# Patient Record
Sex: FEMALE | Race: WHITE | NOT HISPANIC OR LATINO | Employment: UNEMPLOYED | URBAN - METROPOLITAN AREA
[De-identification: names, ages, dates, MRNs, and addresses within clinical notes are randomized per-mention and may not be internally consistent; named-entity substitution may affect disease eponyms.]

---

## 2023-08-04 LAB
ALANINE AMINOTRANSFERASE (SGPT) (U/L) IN SER/PLAS: 9 U/L (ref 7–45)
ALBUMIN (G/DL) IN SER/PLAS: 4.1 G/DL (ref 3.4–5)
ALBUMIN (MG/L) IN URINE: 26.4 MG/L
ALBUMIN/CREATININE (UG/MG) IN URINE: 33.9 UG/MG CRT (ref 0–30)
ALKALINE PHOSPHATASE (U/L) IN SER/PLAS: 88 U/L (ref 33–110)
ANION GAP IN SER/PLAS: 12 MMOL/L (ref 10–20)
ASPARTATE AMINOTRANSFERASE (SGOT) (U/L) IN SER/PLAS: 9 U/L (ref 9–39)
BILIRUBIN TOTAL (MG/DL) IN SER/PLAS: 0.3 MG/DL (ref 0–1.2)
CALCIUM (MG/DL) IN SER/PLAS: 8.9 MG/DL (ref 8.6–10.6)
CARBON DIOXIDE, TOTAL (MMOL/L) IN SER/PLAS: 28 MMOL/L (ref 21–32)
CHLORIDE (MMOL/L) IN SER/PLAS: 105 MMOL/L (ref 98–107)
CREATININE (MG/DL) IN SER/PLAS: 0.58 MG/DL (ref 0.5–1.05)
CREATININE (MG/DL) IN URINE: 77.9 MG/DL (ref 20–320)
ERYTHROCYTE DISTRIBUTION WIDTH (RATIO) BY AUTOMATED COUNT: 15.7 % (ref 11.5–14.5)
ERYTHROCYTE MEAN CORPUSCULAR HEMOGLOBIN CONCENTRATION (G/DL) BY AUTOMATED: 30.4 G/DL (ref 32–36)
ERYTHROCYTE MEAN CORPUSCULAR VOLUME (FL) BY AUTOMATED COUNT: 82 FL (ref 80–100)
ERYTHROCYTES (10*6/UL) IN BLOOD BY AUTOMATED COUNT: 4.63 X10E12/L (ref 4–5.2)
ESTIMATED AVERAGE GLUCOSE FOR HBA1C: 154 MG/DL
GFR FEMALE: >90 ML/MIN/1.73M2
GLUCOSE (MG/DL) IN SER/PLAS: 162 MG/DL (ref 74–99)
HEMATOCRIT (%) IN BLOOD BY AUTOMATED COUNT: 37.8 % (ref 36–46)
HEMOGLOBIN (G/DL) IN BLOOD: 11.5 G/DL (ref 12–16)
HEMOGLOBIN A1C/HEMOGLOBIN TOTAL IN BLOOD: 7 %
LEUKOCYTES (10*3/UL) IN BLOOD BY AUTOMATED COUNT: 5.8 X10E9/L (ref 4.4–11.3)
NRBC (PER 100 WBCS) BY AUTOMATED COUNT: 0 /100 WBC (ref 0–0)
PLATELETS (10*3/UL) IN BLOOD AUTOMATED COUNT: 213 X10E9/L (ref 150–450)
POTASSIUM (MMOL/L) IN SER/PLAS: 4.2 MMOL/L (ref 3.5–5.3)
PROTEIN TOTAL: 5.7 G/DL (ref 6.4–8.2)
SODIUM (MMOL/L) IN SER/PLAS: 141 MMOL/L (ref 136–145)
UREA NITROGEN (MG/DL) IN SER/PLAS: 14 MG/DL (ref 6–23)

## 2023-11-02 ENCOUNTER — ANCILLARY PROCEDURE (OUTPATIENT)
Dept: RADIOLOGY | Facility: CLINIC | Age: 48
End: 2023-11-02
Payer: COMMERCIAL

## 2023-11-02 VITALS — HEIGHT: 66 IN | WEIGHT: 176 LBS | BODY MASS INDEX: 28.28 KG/M2

## 2023-11-02 DIAGNOSIS — Z12.31 SCREENING MAMMOGRAM FOR BREAST CANCER: ICD-10-CM

## 2023-11-02 PROCEDURE — 77063 BREAST TOMOSYNTHESIS BI: CPT

## 2023-11-06 PROBLEM — G47.9 SLEEP DISTURBANCE: Status: ACTIVE | Noted: 2023-11-06

## 2023-11-06 PROBLEM — J84.9 ILD (INTERSTITIAL LUNG DISEASE) (MULTI): Status: ACTIVE | Noted: 2023-11-06

## 2023-11-06 PROBLEM — R10.12 ABDOMINAL PAIN, LUQ (LEFT UPPER QUADRANT): Status: ACTIVE | Noted: 2023-11-06

## 2023-11-06 PROBLEM — J31.0 RHINITIS: Status: ACTIVE | Noted: 2023-11-06

## 2023-11-06 PROBLEM — H53.8 BLURRED VISION, BILATERAL: Status: ACTIVE | Noted: 2023-11-06

## 2023-11-06 PROBLEM — G47.11 IDIOPATHIC HYPERSOMNIA: Status: ACTIVE | Noted: 2023-11-06

## 2023-11-06 PROBLEM — R74.8 ABNORMAL LIVER ENZYMES: Status: ACTIVE | Noted: 2023-11-06

## 2023-11-06 PROBLEM — H52.7 REFRACTIVE ERROR: Status: ACTIVE | Noted: 2023-11-06

## 2023-11-06 PROBLEM — G47.21 CIRCADIAN RHYTHM SLEEP DISORDER, DELAYED SLEEP PHASE TYPE: Status: ACTIVE | Noted: 2023-11-06

## 2023-11-06 PROBLEM — E83.51 HYPOCALCEMIA: Status: ACTIVE | Noted: 2023-11-06

## 2023-11-06 PROBLEM — U07.1 COVID-19 VIRUS INFECTION: Status: ACTIVE | Noted: 2023-11-06

## 2023-11-06 PROBLEM — J18.9 PNEUMONIA: Status: ACTIVE | Noted: 2023-11-06

## 2023-11-06 PROBLEM — J32.9 CHRONIC SINUSITIS: Status: ACTIVE | Noted: 2023-11-06

## 2023-11-06 PROBLEM — G47.10 HYPERSOMNIA: Status: ACTIVE | Noted: 2023-11-06

## 2023-11-06 PROBLEM — H04.123 DRY EYES: Status: ACTIVE | Noted: 2023-11-06

## 2023-11-06 PROBLEM — D81.9: Status: ACTIVE | Noted: 2023-11-06

## 2023-11-06 PROBLEM — J45.909 ASTHMA (HHS-HCC): Status: ACTIVE | Noted: 2023-11-06

## 2023-11-06 PROBLEM — K90.41 NON-CELIAC GLUTEN SENSITIVITY: Status: ACTIVE | Noted: 2023-11-06

## 2023-11-06 PROBLEM — R05.9 COUGH: Status: ACTIVE | Noted: 2023-11-06

## 2023-11-06 PROBLEM — E55.9 VITAMIN D DEFICIENCY: Status: ACTIVE | Noted: 2023-11-06

## 2023-11-06 PROBLEM — G47.00 INSOMNIA: Status: ACTIVE | Noted: 2023-11-06

## 2023-11-06 PROBLEM — K76.0 NAFLD (NONALCOHOLIC FATTY LIVER DISEASE): Status: ACTIVE | Noted: 2023-11-06

## 2023-11-06 PROBLEM — G44.89 OTHER HEADACHE SYNDROME: Status: ACTIVE | Noted: 2023-11-06

## 2023-11-06 PROBLEM — J15.9 BACTERIAL PNEUMONIA: Status: ACTIVE | Noted: 2023-11-06

## 2023-11-06 PROBLEM — R92.8 ABNORMAL MAMMOGRAM OF RIGHT BREAST: Status: ACTIVE | Noted: 2023-11-06

## 2023-11-06 PROBLEM — M25.50 ARTHRALGIA: Status: ACTIVE | Noted: 2023-11-06

## 2023-11-06 PROBLEM — E66.9 OBESITY: Status: ACTIVE | Noted: 2023-11-06

## 2023-11-06 PROBLEM — K21.9 ESOPHAGEAL REFLUX: Status: ACTIVE | Noted: 2023-11-06

## 2023-11-06 PROBLEM — E11.8 TYPE 2 DIABETES MELLITUS WITH COMPLICATION, WITHOUT LONG-TERM CURRENT USE OF INSULIN (MULTI): Status: ACTIVE | Noted: 2019-01-14

## 2023-11-06 PROBLEM — R79.9 ABNORMAL BLOOD CHEMISTRY: Status: ACTIVE | Noted: 2023-11-06

## 2023-11-06 PROBLEM — B37.9 YEAST INFECTION: Status: ACTIVE | Noted: 2023-11-06

## 2023-11-06 PROBLEM — J98.4 RESTRICTIVE LUNG DISEASE: Status: ACTIVE | Noted: 2023-11-06

## 2023-11-06 PROBLEM — M15.9 OSTEOARTHRITIS, MULTIPLE SITES: Status: ACTIVE | Noted: 2023-11-06

## 2023-11-06 PROBLEM — J42 CHRONIC BRONCHITIS (MULTI): Status: ACTIVE | Noted: 2023-11-06

## 2023-11-06 PROBLEM — F32.81 PREMENSTRUAL DYSPHORIC DISORDER: Status: ACTIVE | Noted: 2023-11-06

## 2023-11-06 PROBLEM — E87.6 HYPOKALEMIA: Status: ACTIVE | Noted: 2023-11-06

## 2023-11-06 PROBLEM — G25.81 RESTLESS LEGS SYNDROME: Status: ACTIVE | Noted: 2023-11-06

## 2023-11-06 RX ORDER — GLIPIZIDE 5 MG/1
TABLET ORAL
COMMUNITY
End: 2023-11-13 | Stop reason: SDUPTHER

## 2023-11-06 RX ORDER — LORATADINE 10 MG/1
1 TABLET ORAL DAILY PRN
COMMUNITY
Start: 2013-09-06

## 2023-11-06 RX ORDER — ALBUTEROL SULFATE 90 UG/1
AEROSOL, METERED RESPIRATORY (INHALATION)
COMMUNITY

## 2023-11-06 RX ORDER — NAPROXEN SODIUM 220 MG
1 TABLET ORAL 3 TIMES DAILY PRN
COMMUNITY
Start: 2021-10-19

## 2023-11-06 RX ORDER — ZINC SULFATE 50(220)MG
CAPSULE ORAL
COMMUNITY

## 2023-11-06 RX ORDER — GABAPENTIN 600 MG/1
TABLET ORAL
COMMUNITY

## 2023-11-06 RX ORDER — CALCIUM CARBONATE 200(500)MG
TABLET,CHEWABLE ORAL
COMMUNITY

## 2023-11-06 RX ORDER — LANCETS 33 GAUGE
EACH MISCELLANEOUS
COMMUNITY

## 2023-11-06 RX ORDER — REPAGLINIDE 0.5 MG/1
0.5 TABLET ORAL
COMMUNITY
Start: 2023-08-07 | End: 2023-11-07 | Stop reason: ALTCHOICE

## 2023-11-06 RX ORDER — BLOOD-GLUCOSE METER
EACH MISCELLANEOUS
COMMUNITY

## 2023-11-06 RX ORDER — MENTHOL 40 MG/G
CREAM TOPICAL
COMMUNITY

## 2023-11-06 RX ORDER — TIRZEPATIDE 2.5 MG/.5ML
INJECTION, SOLUTION SUBCUTANEOUS
COMMUNITY
End: 2023-11-07 | Stop reason: ALTCHOICE

## 2023-11-06 RX ORDER — DICLOFENAC SODIUM 10 MG/G
GEL TOPICAL
COMMUNITY

## 2023-11-06 RX ORDER — DIPHENHYDRAMINE HCL 25 MG
25 CAPSULE ORAL EVERY 6 HOURS PRN
COMMUNITY
End: 2023-11-07 | Stop reason: ALTCHOICE

## 2023-11-06 RX ORDER — ALBUTEROL SULFATE 0.83 MG/ML
2.5 SOLUTION RESPIRATORY (INHALATION) EVERY 6 HOURS PRN
COMMUNITY
End: 2023-11-07 | Stop reason: ALTCHOICE

## 2023-11-06 RX ORDER — ACETAMINOPHEN 500 MG
TABLET ORAL
COMMUNITY
End: 2023-11-13 | Stop reason: WASHOUT

## 2023-11-06 RX ORDER — KETOTIFEN FUMARATE 0.35 MG/ML
SOLUTION/ DROPS OPHTHALMIC
COMMUNITY

## 2023-11-06 RX ORDER — ZOLPIDEM TARTRATE 5 MG/1
TABLET ORAL
COMMUNITY
End: 2023-11-07 | Stop reason: ALTCHOICE

## 2023-11-06 RX ORDER — SERTRALINE HYDROCHLORIDE 50 MG/1
50 TABLET, FILM COATED ORAL DAILY
COMMUNITY

## 2023-11-06 RX ORDER — CHOLECALCIFEROL (VITAMIN D3) 50 MCG
2000 TABLET ORAL
COMMUNITY

## 2023-11-06 RX ORDER — TIRZEPATIDE 5 MG/.5ML
INJECTION, SOLUTION SUBCUTANEOUS
COMMUNITY
End: 2023-11-07 | Stop reason: ALTCHOICE

## 2023-11-06 RX ORDER — METFORMIN HYDROCHLORIDE 500 MG/1
TABLET ORAL
COMMUNITY
Start: 2019-01-11 | End: 2023-11-07 | Stop reason: ALTCHOICE

## 2023-11-06 RX ORDER — DOXYCYCLINE 100 MG/1
100 CAPSULE ORAL EVERY 12 HOURS
COMMUNITY
Start: 2023-02-13 | End: 2023-11-07 | Stop reason: ALTCHOICE

## 2023-11-06 RX ORDER — CEFUROXIME AXETIL 500 MG/1
TABLET ORAL
COMMUNITY
Start: 2019-01-11 | End: 2023-11-07 | Stop reason: ALTCHOICE

## 2023-11-06 RX ORDER — MENTHOL/CAMPHOR
OINTMENT (GRAM) TOPICAL
COMMUNITY

## 2023-11-06 RX ORDER — MOMETASONE FUROATE 50 UG/1
SPRAY, METERED NASAL
COMMUNITY

## 2023-11-07 ENCOUNTER — OFFICE VISIT (OUTPATIENT)
Dept: OBSTETRICS AND GYNECOLOGY | Facility: CLINIC | Age: 48
End: 2023-11-07
Payer: COMMERCIAL

## 2023-11-07 VITALS
WEIGHT: 187 LBS | BODY MASS INDEX: 30.05 KG/M2 | SYSTOLIC BLOOD PRESSURE: 138 MMHG | HEIGHT: 66 IN | DIASTOLIC BLOOD PRESSURE: 76 MMHG

## 2023-11-07 DIAGNOSIS — Z12.31 SCREENING MAMMOGRAM FOR BREAST CANCER: ICD-10-CM

## 2023-11-07 DIAGNOSIS — F32.81 PREMENSTRUAL DYSPHORIC DISORDER: Primary | ICD-10-CM

## 2023-11-07 PROCEDURE — 3051F HG A1C>EQUAL 7.0%<8.0%: CPT | Performed by: OBSTETRICS & GYNECOLOGY

## 2023-11-07 PROCEDURE — 3078F DIAST BP <80 MM HG: CPT | Performed by: OBSTETRICS & GYNECOLOGY

## 2023-11-07 PROCEDURE — 99396 PREV VISIT EST AGE 40-64: CPT | Performed by: OBSTETRICS & GYNECOLOGY

## 2023-11-07 PROCEDURE — 3075F SYST BP GE 130 - 139MM HG: CPT | Performed by: OBSTETRICS & GYNECOLOGY

## 2023-11-07 RX ORDER — SERTRALINE HYDROCHLORIDE 50 MG/1
50 TABLET, FILM COATED ORAL DAILY
Qty: 90 TABLET | Refills: 3 | Status: CANCELLED | OUTPATIENT
Start: 2023-11-07

## 2023-11-07 RX ORDER — SERTRALINE HYDROCHLORIDE 50 MG/1
50 TABLET, FILM COATED ORAL DAILY
Qty: 30 TABLET | Refills: 11 | Status: SHIPPED | OUTPATIENT
Start: 2023-11-07 | End: 2024-11-01

## 2023-11-07 NOTE — PROGRESS NOTES
Subjective   Patient ID: Harriet Russo is a 48 y.o. female who presents for well woman visit.  Established patient annual exam.  48 years old.  No pregnancies.  No need for contraception.  Cycles are becoming lighter.    Meds for premenstrual dysphoric disorder including Zoloft.  Will refill.  Non-smoker.  On oral meds for diabetes    Works from home for [employer]    Last Pap 2021.  Negative negative.  Next Pap 2026    On exam breast abdominal pelvic exam normal.  Refill Zoloft.  Requisition mammogram.  Follow-up 1 year.  Well woman exam        Review of Systems   All other systems reviewed and are negative.  BI mammo bilateral screening tomosynthesis  Status: Final result     Study Result    Narrative & Impression   Interpreted By:  Dennis Lange,   STUDY:  BI MAMMO BILATERAL SCREENING TOMOSYNTHESIS;  11/2/2023 9:49 am      ACCESSION NUMBER(S):  [id number]      ORDERING CLINICIAN:  DONITA COLES      INDICATION:  Screening.      Based on the Tyrer-Cuzick model for breast cancer risk assessment,  the patient's lifetime risk of breast cancer is 11.3%.      COMPARISON:  2022, 2021, 2017      FINDINGS:  2D and tomosynthesis images were reviewed at 1 mm slice thickness.      Density:  The breast tissue is heterogeneously dense, which may  obscure small masses.      No dominant masses or malignant calcifications are identified.  Scattered benign appearing calcifications are present. There is no  interval change when compared to the previous examination.      IMPRESSION:  No mammographic evidence of malignancy.      BI-RADS CATEGORY:      BI-RADS Category:  2 Benign.  Recommendation:  Routine Screening Mammogram in 1 Year.  Recommended Date:  1 Year.  Laterality:  Bilateral.      For any future breast imaging appointments, please call [phone]). The patient has been entered into a computer reminder system  with a target due date for the next exam.          MACRO:  None      Signed by: Dennis Lange 11/2/2023 2:21  PM  Dictation workstation:   [id number]       Objective   Physical Exam  Chest:   Breasts:     Right: Normal.      Left: Normal.   Genitourinary:     General: Normal vulva.      Vagina: Normal.      Cervix: Normal.      Uterus: Normal.       Adnexa: Right adnexa normal and left adnexa normal.         Assessment/Plan   Well woman exam.  Requisition mammogram.  Refill Zoloft for PMDD.  Next Pap 2026.  Follow-up 1 year

## 2023-11-13 ENCOUNTER — OFFICE VISIT (OUTPATIENT)
Dept: ENDOCRINOLOGY | Facility: CLINIC | Age: 48
End: 2023-11-13
Payer: COMMERCIAL

## 2023-11-13 VITALS
SYSTOLIC BLOOD PRESSURE: 149 MMHG | HEART RATE: 93 BPM | DIASTOLIC BLOOD PRESSURE: 89 MMHG | BODY MASS INDEX: 30.99 KG/M2 | WEIGHT: 192 LBS

## 2023-11-13 DIAGNOSIS — E11.8 TYPE 2 DIABETES MELLITUS WITH COMPLICATION, WITHOUT LONG-TERM CURRENT USE OF INSULIN (MULTI): ICD-10-CM

## 2023-11-13 LAB — POC HEMOGLOBIN A1C: 8.2 % (ref 4.2–6.5)

## 2023-11-13 PROCEDURE — 99214 OFFICE O/P EST MOD 30 MIN: CPT | Performed by: INTERNAL MEDICINE

## 2023-11-13 PROCEDURE — 3051F HG A1C>EQUAL 7.0%<8.0%: CPT | Performed by: INTERNAL MEDICINE

## 2023-11-13 PROCEDURE — 3077F SYST BP >= 140 MM HG: CPT | Performed by: INTERNAL MEDICINE

## 2023-11-13 PROCEDURE — 3079F DIAST BP 80-89 MM HG: CPT | Performed by: INTERNAL MEDICINE

## 2023-11-13 PROCEDURE — 83036 HEMOGLOBIN GLYCOSYLATED A1C: CPT | Performed by: INTERNAL MEDICINE

## 2023-11-13 RX ORDER — GLIPIZIDE 5 MG/1
10 TABLET ORAL
Qty: 360 TABLET | Refills: 3 | Status: SHIPPED | OUTPATIENT
Start: 2023-11-13 | End: 2024-11-12

## 2023-11-13 ASSESSMENT — ENCOUNTER SYMPTOMS
COUGH: 0
ABDOMINAL PAIN: 0
NAUSEA: 0
ENDOCRINE COMMENTS: AS ABOVE
DIARRHEA: 0
VOMITING: 0
UNEXPECTED WEIGHT CHANGE: 0

## 2023-11-13 NOTE — PATIENT INSTRUCTIONS
RECOMMENDATIONS  Increase Glipizide to 10 mg twice daily before breakfast and dinner.   Follow up 3 months  Repeat A1c at next appointment

## 2023-11-13 NOTE — PROGRESS NOTES
History Of Present Illness  Harriet Russo is a 48 y.o. female     Duration of type 2 diabetes mellitus:  14 years  Complications:  none    Hyperglycemia since respiratory infection in August/Septemer    Glucose had been as high as 400    Stopped repaglinide and resumed glipizide 3 weeks ago.   Glipizide 5 mg QAM, 10 mg at dinner.    Prior use of Mounjaro, stopped due to cost.  She is aware cost for Ozempic would be the same.  Intolerant to metformin.  History of polyuria and vaginal candidiasis on Invokana  Prior use of Victoza, without recall of side effect.     Patient is testing glucose 1 time daily  Records reviewed, on file    Last eye exam:  October 2022    Past Medical History  She has a past medical history of Dry eye syndrome of unspecified lacrimal gland (04/01/2013), Other conditions influencing health status (04/01/2013), Unspecified disorder of refraction (04/01/2013), and Unspecified inflammation of eyelid.    Surgical History  She has a past surgical history that includes Mouth surgery (04/14/2016).     Social History  She has no history on file for tobacco use, alcohol use, and drug use.    Family History  Family History   Problem Relation Name Age of Onset    No Known Problems Mother      Atrial fibrillation Father      Mitral valve prolapse Father      Ovarian cancer Maternal Great-Grandmother  65    Glaucoma Other grandfather     Other (compressed vertebrea) Other      Arthritis Other      Other (adverse food reaction) Other family         not anaphylactic    Other (anaphylaxis due to bee venom) Other family     Other (complain of allergic reaction seasonal) Other family     Heart failure Other family     Eczema Other family     Emphysema Other family     Other (hypogammaglobulinemia) Other family     Irritable bowel syndrome Other family     Other (nasal polyps) Other family        Allergies  Hizentra [immun glob g(igg)-pro-iga 0-50]; Immune globulin,gamma (igg) human; Octagam [immun  "globg(igg)-malt-iga ov50]; Pramipexole; Ropinirole; Sulfur; Tramadol; Amoxicillin; and Nabumetone    Review of Systems   Constitutional:  Negative for unexpected weight change.   Respiratory:  Negative for cough.    Gastrointestinal:  Negative for abdominal pain, diarrhea, nausea and vomiting.   Endocrine:        As above         Last Recorded Vitals  Blood pressure 149/89, pulse 93, weight 87.1 kg (192 lb), last menstrual period 11/05/2023.    Physical Exam  Constitutional:       General: She is not in acute distress.  HENT:      Head: Normocephalic.   Eyes:      Extraocular Movements: Extraocular movements intact.   Neck:      Thyroid: No thyromegaly.   Cardiovascular:      Pulses:           Radial pulses are 2+ on the right side and 2+ on the left side.   Musculoskeletal:      Right lower leg: No edema.      Left lower leg: No edema.   Lymphadenopathy:      Cervical: No cervical adenopathy.   Neurological:      Mental Status: She is alert.   Psychiatric:         Mood and Affect: Affect normal.          Relevant Results  Glucose (mg/dL)   Date Value   08/04/2023 162 (H)   05/23/2022 373 (H)   06/10/2021 305 (H)     POC HEMOGLOBIN A1c (%)   Date Value   11/13/2023 8.2 (A)     Hemoglobin A1C (%)   Date Value   08/04/2023 7.0 (A)   05/23/2022 7.8 (A)   06/09/2021 11.6     Bicarbonate (mmol/L)   Date Value   08/04/2023 28   05/23/2022 28   06/10/2021 25     Urea Nitrogen (mg/dL)   Date Value   08/04/2023 14   05/23/2022 11   06/10/2021 7     Creatinine (mg/dL)   Date Value   08/04/2023 0.58   05/23/2022 0.61   06/10/2021 0.50     No components found for: \"[id number]\"  Lab Results   Component Value Date    CHOL 196 05/23/2022     Lab Results   Component Value Date    HDL 31.5 (A) 05/23/2022     No results found for: \"LDLCALC\"  Lab Results   Component Value Date    TRIG 260 (H) 05/23/2022     No components found for: \"CHOLHDL\"   Lab Results   Component Value Date    TSH 2.48 05/23/2022       IMPRESSION  TYPE 2 " DIABETES MELLITUS  Rapid A1c 8.2%  Recent loss of glucose control   Improving with resumed glipizide    RECOMMENDATIONS  Increase Glipizide to 10 mg twice daily before breakfast and dinner.   Follow up 3 months  Repeat A1c at next appointment

## 2024-03-04 ENCOUNTER — APPOINTMENT (OUTPATIENT)
Dept: ENDOCRINOLOGY | Facility: CLINIC | Age: 49
End: 2024-03-04
Payer: COMMERCIAL

## 2024-07-26 ENCOUNTER — TELEPHONE (OUTPATIENT)
Dept: SLEEP MEDICINE | Facility: HOSPITAL | Age: 49
End: 2024-07-26

## 2024-07-26 ENCOUNTER — APPOINTMENT (OUTPATIENT)
Dept: SLEEP MEDICINE | Facility: CLINIC | Age: 49
End: 2024-07-26
Payer: COMMERCIAL

## 2024-07-26 DIAGNOSIS — G25.81 RESTLESS LEG SYNDROME: ICD-10-CM

## 2024-07-26 NOTE — TELEPHONE ENCOUNTER
Pt called because she needs to reschedule yearly appt with sleep provider. Pt requested gabapentin 600mg #120 for 90 days medication refill.    This RN checked [facility] and it is consist with prescribed medications. Patient has been filling Rx appropriately. Prescription request sent to provider Dr. Obrien to review.

## 2024-07-28 RX ORDER — GABAPENTIN 600 MG/1
TABLET ORAL
Qty: 120 TABLET | Refills: 2 | Status: SHIPPED | OUTPATIENT
Start: 2024-07-28

## 2024-10-28 ENCOUNTER — OFFICE VISIT (OUTPATIENT)
Dept: RHEUMATOLOGY | Facility: CLINIC | Age: 49
End: 2024-10-28
Payer: COMMERCIAL

## 2024-10-28 VITALS
WEIGHT: 176.6 LBS | DIASTOLIC BLOOD PRESSURE: 89 MMHG | OXYGEN SATURATION: 100 % | SYSTOLIC BLOOD PRESSURE: 163 MMHG | BODY MASS INDEX: 28.5 KG/M2 | HEART RATE: 113 BPM

## 2024-10-28 DIAGNOSIS — M25.852 FEMOROACETABULAR IMPINGEMENT OF BOTH HIPS: ICD-10-CM

## 2024-10-28 DIAGNOSIS — M25.851 FEMOROACETABULAR IMPINGEMENT OF BOTH HIPS: ICD-10-CM

## 2024-10-28 DIAGNOSIS — M25.50 POLYARTHRALGIA: Primary | ICD-10-CM

## 2024-10-28 DIAGNOSIS — D83.9 CVID (COMMON VARIABLE IMMUNODEFICIENCY): ICD-10-CM

## 2024-10-28 PROCEDURE — 3079F DIAST BP 80-89 MM HG: CPT | Performed by: INTERNAL MEDICINE

## 2024-10-28 PROCEDURE — 99215 OFFICE O/P EST HI 40 MIN: CPT | Performed by: INTERNAL MEDICINE

## 2024-10-28 PROCEDURE — 3077F SYST BP >= 140 MM HG: CPT | Performed by: INTERNAL MEDICINE

## 2024-10-28 PROCEDURE — 99205 OFFICE O/P NEW HI 60 MIN: CPT | Performed by: INTERNAL MEDICINE

## 2024-10-28 ASSESSMENT — ENCOUNTER SYMPTOMS
DEPRESSION: 0
LOSS OF SENSATION IN FEET: 1
OCCASIONAL FEELINGS OF UNSTEADINESS: 0

## 2024-10-28 ASSESSMENT — PATIENT HEALTH QUESTIONNAIRE - PHQ9
1. LITTLE INTEREST OR PLEASURE IN DOING THINGS: NOT AT ALL
SUM OF ALL RESPONSES TO PHQ9 QUESTIONS 1 AND 2: 0
2. FEELING DOWN, DEPRESSED OR HOPELESS: NOT AT ALL

## 2024-10-28 ASSESSMENT — PAIN SCALES - GENERAL: PAINLEVEL_OUTOF10: 2

## 2024-10-31 ENCOUNTER — HOSPITAL ENCOUNTER (OUTPATIENT)
Dept: RADIOLOGY | Facility: CLINIC | Age: 49
Discharge: HOME | End: 2024-10-31
Payer: COMMERCIAL

## 2024-10-31 ENCOUNTER — LAB (OUTPATIENT)
Dept: LAB | Facility: LAB | Age: 49
End: 2024-10-31
Payer: COMMERCIAL

## 2024-10-31 DIAGNOSIS — D50.9 MICROCYTIC ANEMIA: ICD-10-CM

## 2024-10-31 DIAGNOSIS — M25.50 POLYARTHRALGIA: ICD-10-CM

## 2024-10-31 LAB
ALBUMIN SERPL BCP-MCNC: 4.4 G/DL (ref 3.4–5)
ALP SERPL-CCNC: 119 U/L (ref 33–110)
ALT SERPL W P-5'-P-CCNC: 8 U/L (ref 7–45)
ANION GAP SERPL CALC-SCNC: 14 MMOL/L (ref 10–20)
AST SERPL W P-5'-P-CCNC: 8 U/L (ref 9–39)
BASOPHILS # BLD AUTO: 0.04 X10*3/UL (ref 0–0.1)
BASOPHILS NFR BLD AUTO: 0.5 %
BILIRUB SERPL-MCNC: 0.3 MG/DL (ref 0–1.2)
BUN SERPL-MCNC: 22 MG/DL (ref 6–23)
CALCIUM SERPL-MCNC: 9.9 MG/DL (ref 8.6–10.6)
CCP IGG SERPL-ACNC: <1 U/ML
CENTROMERE B AB SER-ACNC: <0.2 AI
CHLORIDE SERPL-SCNC: 102 MMOL/L (ref 98–107)
CHROMATIN AB SERPL-ACNC: <0.2 AI
CO2 SERPL-SCNC: 30 MMOL/L (ref 21–32)
CREAT SERPL-MCNC: 0.54 MG/DL (ref 0.5–1.05)
CRP SERPL-MCNC: 3.09 MG/DL
DSDNA AB SER-ACNC: <1 IU/ML
EGFRCR SERPLBLD CKD-EPI 2021: >90 ML/MIN/1.73M*2
ENA JO1 AB SER QL IA: <0.2 AI
ENA RNP AB SER IA-ACNC: <0.2 AI
ENA SCL70 AB SER QL IA: <0.2 AI
ENA SM AB SER IA-ACNC: <0.2 AI
ENA SM+RNP AB SER QL IA: <0.2 AI
ENA SS-A AB SER IA-ACNC: <0.2 AI
ENA SS-B AB SER IA-ACNC: <0.2 AI
EOSINOPHIL # BLD AUTO: 0.25 X10*3/UL (ref 0–0.7)
EOSINOPHIL NFR BLD AUTO: 3.2 %
ERYTHROCYTE [DISTWIDTH] IN BLOOD BY AUTOMATED COUNT: 15.2 % (ref 11.5–14.5)
ERYTHROCYTE [SEDIMENTATION RATE] IN BLOOD BY WESTERGREN METHOD: 37 MM/H (ref 0–20)
GLUCOSE SERPL-MCNC: 186 MG/DL (ref 74–99)
HCT VFR BLD AUTO: 36.1 % (ref 36–46)
HGB BLD-MCNC: 11.4 G/DL (ref 12–16)
IMM GRANULOCYTES # BLD AUTO: 0.06 X10*3/UL (ref 0–0.7)
IMM GRANULOCYTES NFR BLD AUTO: 0.8 % (ref 0–0.9)
LYMPHOCYTES # BLD AUTO: 1.76 X10*3/UL (ref 1.2–4.8)
LYMPHOCYTES NFR BLD AUTO: 22.7 %
MCH RBC QN AUTO: 24.4 PG (ref 26–34)
MCHC RBC AUTO-ENTMCNC: 31.6 G/DL (ref 32–36)
MCV RBC AUTO: 77 FL (ref 80–100)
MONOCYTES # BLD AUTO: 0.58 X10*3/UL (ref 0.1–1)
MONOCYTES NFR BLD AUTO: 7.5 %
NEUTROPHILS # BLD AUTO: 5.06 X10*3/UL (ref 1.2–7.7)
NEUTROPHILS NFR BLD AUTO: 65.3 %
NRBC BLD-RTO: 0 /100 WBCS (ref 0–0)
PLATELET # BLD AUTO: 320 X10*3/UL (ref 150–450)
POTASSIUM SERPL-SCNC: 4.6 MMOL/L (ref 3.5–5.3)
PROT SERPL-MCNC: 6.1 G/DL (ref 6.4–8.2)
RBC # BLD AUTO: 4.67 X10*6/UL (ref 4–5.2)
RHEUMATOID FACT SER NEPH-ACNC: <10 IU/ML (ref 0–15)
RIBOSOMAL P AB SER-ACNC: <0.2 AI
SODIUM SERPL-SCNC: 141 MMOL/L (ref 136–145)
WBC # BLD AUTO: 7.8 X10*3/UL (ref 4.4–11.3)

## 2024-10-31 PROCEDURE — 73560 X-RAY EXAM OF KNEE 1 OR 2: CPT | Mod: 50

## 2024-10-31 PROCEDURE — 86140 C-REACTIVE PROTEIN: CPT

## 2024-10-31 PROCEDURE — 86235 NUCLEAR ANTIGEN ANTIBODY: CPT

## 2024-10-31 PROCEDURE — 73120 X-RAY EXAM OF HAND: CPT | Mod: 50

## 2024-10-31 PROCEDURE — 83540 ASSAY OF IRON: CPT

## 2024-10-31 PROCEDURE — 86200 CCP ANTIBODY: CPT

## 2024-10-31 PROCEDURE — 85652 RBC SED RATE AUTOMATED: CPT

## 2024-10-31 PROCEDURE — 86431 RHEUMATOID FACTOR QUANT: CPT

## 2024-10-31 PROCEDURE — 86038 ANTINUCLEAR ANTIBODIES: CPT

## 2024-10-31 PROCEDURE — 82728 ASSAY OF FERRITIN: CPT

## 2024-10-31 PROCEDURE — 85025 COMPLETE CBC W/AUTO DIFF WBC: CPT

## 2024-10-31 PROCEDURE — 36415 COLL VENOUS BLD VENIPUNCTURE: CPT

## 2024-10-31 PROCEDURE — 80053 COMPREHEN METABOLIC PANEL: CPT

## 2024-10-31 PROCEDURE — 83550 IRON BINDING TEST: CPT

## 2024-10-31 PROCEDURE — 86225 DNA ANTIBODY NATIVE: CPT

## 2024-10-31 PROCEDURE — 73030 X-RAY EXAM OF SHOULDER: CPT | Mod: RT

## 2024-11-01 DIAGNOSIS — D50.9 MICROCYTIC ANEMIA: Primary | ICD-10-CM

## 2024-11-01 LAB
FERRITIN SERPL-MCNC: 105 NG/ML (ref 8–150)
IRON SATN MFR SERPL: 8 % (ref 25–45)
IRON SERPL-MCNC: 30 UG/DL (ref 35–150)
TIBC SERPL-MCNC: 363 UG/DL (ref 240–445)
UIBC SERPL-MCNC: 333 UG/DL (ref 110–370)

## 2024-11-04 LAB — ANA SER QL HEP2 SUBST: NEGATIVE

## 2024-11-05 DIAGNOSIS — M25.50 POLYARTHRALGIA: Primary | ICD-10-CM

## 2024-11-06 ENCOUNTER — HOSPITAL ENCOUNTER (OUTPATIENT)
Dept: RADIOLOGY | Facility: CLINIC | Age: 49
Discharge: HOME | End: 2024-11-06
Payer: COMMERCIAL

## 2024-11-06 ENCOUNTER — TELEPHONE (OUTPATIENT)
Facility: CLINIC | Age: 49
End: 2024-11-06
Payer: COMMERCIAL

## 2024-11-06 VITALS — HEIGHT: 66 IN | WEIGHT: 176 LBS | BODY MASS INDEX: 28.28 KG/M2

## 2024-11-06 DIAGNOSIS — Z12.31 SCREENING MAMMOGRAM FOR BREAST CANCER: ICD-10-CM

## 2024-11-06 PROCEDURE — 77067 SCR MAMMO BI INCL CAD: CPT

## 2024-11-06 PROCEDURE — 77067 SCR MAMMO BI INCL CAD: CPT | Performed by: STUDENT IN AN ORGANIZED HEALTH CARE EDUCATION/TRAINING PROGRAM

## 2024-11-06 PROCEDURE — 77063 BREAST TOMOSYNTHESIS BI: CPT | Performed by: STUDENT IN AN ORGANIZED HEALTH CARE EDUCATION/TRAINING PROGRAM

## 2024-11-06 NOTE — TELEPHONE ENCOUNTER
I called the patient to let her know the doctor ordered an ultrasound of her hands and to also remind her to schedule an appointment with Endocrinology. I spoke with the patient.

## 2024-11-12 ENCOUNTER — APPOINTMENT (OUTPATIENT)
Dept: OBSTETRICS AND GYNECOLOGY | Facility: CLINIC | Age: 49
End: 2024-11-12
Payer: COMMERCIAL

## 2024-11-12 VITALS
BODY MASS INDEX: 27.8 KG/M2 | WEIGHT: 173 LBS | DIASTOLIC BLOOD PRESSURE: 76 MMHG | HEIGHT: 66 IN | SYSTOLIC BLOOD PRESSURE: 126 MMHG

## 2024-11-12 DIAGNOSIS — Z12.31 SCREENING MAMMOGRAM FOR BREAST CANCER: ICD-10-CM

## 2024-11-12 DIAGNOSIS — Z01.419 WELL WOMAN EXAM: ICD-10-CM

## 2024-11-12 DIAGNOSIS — N94.3 PMS (PREMENSTRUAL SYNDROME): Primary | ICD-10-CM

## 2024-11-12 PROCEDURE — 3074F SYST BP LT 130 MM HG: CPT | Performed by: OBSTETRICS & GYNECOLOGY

## 2024-11-12 PROCEDURE — 3008F BODY MASS INDEX DOCD: CPT | Performed by: OBSTETRICS & GYNECOLOGY

## 2024-11-12 PROCEDURE — 3078F DIAST BP <80 MM HG: CPT | Performed by: OBSTETRICS & GYNECOLOGY

## 2024-11-12 PROCEDURE — 99396 PREV VISIT EST AGE 40-64: CPT | Performed by: OBSTETRICS & GYNECOLOGY

## 2024-11-12 RX ORDER — IBUPROFEN 200 MG
200 TABLET ORAL EVERY 6 HOURS
COMMUNITY

## 2024-11-12 RX ORDER — SERTRALINE HYDROCHLORIDE 50 MG/1
50 TABLET, FILM COATED ORAL DAILY
Qty: 30 TABLET | Refills: 11 | Status: SHIPPED | OUTPATIENT
Start: 2024-11-12 | End: 2025-11-12

## 2024-11-12 NOTE — PROGRESS NOTES
Subjective   Patient ID: Harriet Russo is a 49 y.o. female who presents for Well woman visit.  Review of my note from annual in 2023,  Expand All Collapse All       Subjective  Patient ID: Harriet Russo is a 48 y.o. female who presents for well woman visit.  Established patient annual exam.  48 years old.  No pregnancies.  No need for contraception.  Cycles are becoming lighter.     Meds for premenstrual dysphoric disorder including Zoloft.  Will refill.  Non-smoker.  On oral meds for diabetes     Works from home for [employer]     Last Pap 2021.  Negative negative.  Next Pap 2026     On exam breast abdominal pelvic exam normal.  Refill Zoloft.  Requisition mammogram.  Follow-up 1 year.  Well woman exam      Recent mammogram,  BI mammo bilateral screening tomosynthesis  Status: Final result    Study Result    Narrative & Impression  Interpreted By:  Elia Rose,   STUDY:  BI MAMMO BILATERAL SCREENING TOMOSYNTHESIS;  11/6/2024 2:10 pm      ACCESSION NUMBER(S):  [id number]      ORDERING CLINICIAN:  DONITA COLES      INDICATION:  Screening.      ,Z12.31 Encounter for screening mammogram for malignant neoplasm of  breast      COMPARISON:  01/02/2023, 10/20/2022, 08/11/2021      FINDINGS:  2D and tomosynthesis images were reviewed at 1 mm slice thickness.      Density:  There are scattered areas of fibroglandular density.      Stable focal asymmetry with associated benign calcifications in the  superior right breast at middle depth. No suspicious masses or  calcifications are identified.      IMPRESSION:  No mammographic evidence of malignancy.      BI-RADS CATEGORY:  BI-RADS Category:  2 Benign.  Recommendation:  Annual Screening.  Recommended Date:  1 Year.  Laterality:  Bilateral.              For any future breast imaging appointments, please call [phone]).          MACRO:  None      Signed by: Elia Rose 11/7/2024 4:22 PM  Dictation workstation:   [id number]    49 years old annual exam.  She is now working  for a [employer].  She has a number of pets at home including 6 cats and 2 dogs.  Nupathe cycles are monthly they do vary from 3 days of heavy bleeding to 10 days of light.  Last Pap 2021.  Next Pap 2026.  We will refill her Zoloft for premenstrual dysphoric disorder non-smoker    On exam breast abdominal pelvic exams normal.  Well woman exam.  Follow-up 1 year        Review of Systems   All other systems reviewed and are negative.      Objective   Physical Exam  Constitutional:       Appearance: Normal appearance. She is normal weight.   Chest:   Breasts:     Right: Normal.      Left: Normal.   Genitourinary:     General: Normal vulva.      Cervix: Normal.      Uterus: Normal.       Adnexa: Right adnexa normal and left adnexa normal.   Neurological:      Mental Status: She is alert.         Assessment/Plan   Well woman exam.  Mammogram reviewed.  Refill of Zoloft 50 for PMDD .  States no need for contraception         Bill Hicks MD 11/12/24 11:49 AM

## 2024-11-14 ENCOUNTER — OFFICE VISIT (OUTPATIENT)
Dept: SLEEP MEDICINE | Facility: HOSPITAL | Age: 49
End: 2024-11-14
Payer: COMMERCIAL

## 2024-11-14 DIAGNOSIS — G47.00 INSOMNIA, UNSPECIFIED TYPE: ICD-10-CM

## 2024-11-14 DIAGNOSIS — G25.81 RESTLESS LEGS SYNDROME: Primary | ICD-10-CM

## 2024-11-14 DIAGNOSIS — G47.21 CIRCADIAN RHYTHM SLEEP DISORDER, DELAYED SLEEP PHASE TYPE: ICD-10-CM

## 2024-11-14 DIAGNOSIS — G47.26 SHIFT WORK SLEEP DISORDER: ICD-10-CM

## 2024-11-14 DIAGNOSIS — G25.81 RESTLESS LEG SYNDROME: ICD-10-CM

## 2024-11-14 PROCEDURE — 99214 OFFICE O/P EST MOD 30 MIN: CPT | Mod: 95 | Performed by: STUDENT IN AN ORGANIZED HEALTH CARE EDUCATION/TRAINING PROGRAM

## 2024-11-14 PROCEDURE — 99214 OFFICE O/P EST MOD 30 MIN: CPT | Performed by: STUDENT IN AN ORGANIZED HEALTH CARE EDUCATION/TRAINING PROGRAM

## 2024-11-14 RX ORDER — GABAPENTIN 600 MG/1
TABLET ORAL
Qty: 120 TABLET | Refills: 3 | Status: SHIPPED | OUTPATIENT
Start: 2024-11-14

## 2024-11-14 ASSESSMENT — ENCOUNTER SYMPTOMS
CONSTITUTIONAL NEGATIVE: 1
PSYCHIATRIC NEGATIVE: 1
CARDIOVASCULAR NEGATIVE: 1
RESPIRATORY NEGATIVE: 1
NEUROLOGICAL NEGATIVE: 1

## 2024-11-15 ENCOUNTER — APPOINTMENT (OUTPATIENT)
Dept: RADIOLOGY | Facility: HOSPITAL | Age: 49
End: 2024-11-15
Payer: COMMERCIAL

## 2024-11-15 NOTE — ASSESSMENT & PLAN NOTE
Working second shift and doesn't get home till about 12:30-1 am.  And schedule went on to be later and later, consequently would sleep until 1-2 pm hence, recently not falling asleep until about 5 am.    Advised to slowly pull back her clock by adjusting to wake up 15-30 min earlier each week while still practicing good sleep hygiene and let her body tell her when she is ready to sleep

## 2024-11-15 NOTE — ASSESSMENT & PLAN NOTE
- symptoms well controlled on gabapentin 600 mg  - patient increase to 1.5-2 tabs (900 - 1200 mg) during her pre-period time with improvement  - stable, renew script

## 2024-11-18 ENCOUNTER — APPOINTMENT (OUTPATIENT)
Dept: RADIOLOGY | Facility: HOSPITAL | Age: 49
End: 2024-11-18
Payer: COMMERCIAL

## 2024-11-19 ENCOUNTER — HOSPITAL ENCOUNTER (OUTPATIENT)
Dept: RADIOLOGY | Facility: HOSPITAL | Age: 49
Discharge: HOME | End: 2024-11-19
Payer: COMMERCIAL

## 2024-11-19 DIAGNOSIS — M25.50 POLYARTHRALGIA: ICD-10-CM

## 2024-11-19 PROCEDURE — 76882 US LMTD JT/FCL EVL NVASC XTR: CPT | Performed by: RADIOLOGY

## 2024-11-19 PROCEDURE — 76881 US COMPL JOINT R-T W/IMG: CPT

## 2024-11-23 DIAGNOSIS — M65.941 TENOSYNOVITIS OF BOTH HANDS: Primary | ICD-10-CM

## 2024-11-23 DIAGNOSIS — M65.942 TENOSYNOVITIS OF BOTH HANDS: Primary | ICD-10-CM

## 2024-11-24 ENCOUNTER — PATIENT MESSAGE (OUTPATIENT)
Facility: CLINIC | Age: 49
End: 2024-11-24
Payer: COMMERCIAL

## 2024-11-29 ENCOUNTER — APPOINTMENT (OUTPATIENT)
Facility: CLINIC | Age: 49
End: 2024-11-29
Payer: COMMERCIAL

## 2024-12-02 ENCOUNTER — APPOINTMENT (OUTPATIENT)
Dept: SLEEP MEDICINE | Facility: CLINIC | Age: 49
End: 2024-12-02
Payer: COMMERCIAL

## 2024-12-04 ENCOUNTER — TELEPHONE (OUTPATIENT)
Dept: RHEUMATOLOGY | Facility: CLINIC | Age: 49
End: 2024-12-04

## 2024-12-04 ENCOUNTER — APPOINTMENT (OUTPATIENT)
Dept: RHEUMATOLOGY | Facility: CLINIC | Age: 49
End: 2024-12-04
Payer: COMMERCIAL

## 2024-12-08 ENCOUNTER — APPOINTMENT (OUTPATIENT)
Dept: RADIOLOGY | Facility: HOSPITAL | Age: 49
End: 2024-12-08
Payer: COMMERCIAL

## 2024-12-10 ENCOUNTER — TELEPHONE (OUTPATIENT)
Dept: RHEUMATOLOGY | Facility: CLINIC | Age: 49
End: 2024-12-10
Payer: COMMERCIAL

## 2024-12-10 NOTE — TELEPHONE ENCOUNTER
I called to see if the patient had a number on a denial letter the doctor could call to appeal it.

## 2024-12-18 ENCOUNTER — APPOINTMENT (OUTPATIENT)
Dept: RADIOLOGY | Facility: CLINIC | Age: 49
End: 2024-12-18
Payer: COMMERCIAL

## 2024-12-18 ENCOUNTER — HOSPITAL ENCOUNTER (OUTPATIENT)
Dept: RADIOLOGY | Facility: CLINIC | Age: 49
Discharge: HOME | End: 2024-12-18
Payer: COMMERCIAL

## 2024-12-18 DIAGNOSIS — M65.942 TENOSYNOVITIS OF BOTH HANDS: ICD-10-CM

## 2024-12-18 DIAGNOSIS — M65.941 TENOSYNOVITIS OF BOTH HANDS: ICD-10-CM

## 2024-12-18 PROCEDURE — 2550000001 HC RX 255 CONTRASTS: Performed by: INTERNAL MEDICINE

## 2024-12-18 PROCEDURE — A9575 INJ GADOTERATE MEGLUMI 0.1ML: HCPCS | Performed by: INTERNAL MEDICINE

## 2024-12-18 PROCEDURE — 73220 MRI UPPR EXTREMITY W/O&W/DYE: CPT | Mod: RT

## 2024-12-18 RX ORDER — GADOTERATE MEGLUMINE 376.9 MG/ML
0.2 INJECTION INTRAVENOUS
Status: COMPLETED | OUTPATIENT
Start: 2024-12-18 | End: 2024-12-18

## 2024-12-19 ENCOUNTER — PATIENT MESSAGE (OUTPATIENT)
Dept: RHEUMATOLOGY | Facility: CLINIC | Age: 49
End: 2024-12-19
Payer: COMMERCIAL

## 2024-12-20 DIAGNOSIS — M19.90 INFLAMMATORY ARTHRITIS: Primary | ICD-10-CM

## 2024-12-20 DIAGNOSIS — T14.8XXA TENDON TEAR: ICD-10-CM

## 2024-12-20 RX ORDER — MELOXICAM 15 MG/1
15 TABLET ORAL DAILY
Qty: 30 TABLET | Refills: 0 | Status: SHIPPED | OUTPATIENT
Start: 2024-12-20 | End: 2025-01-19

## 2025-01-05 NOTE — PROGRESS NOTES
Subjective   Patient ID: [id number]  Harriet Russo is a 49 y.o. female who presents for follow up     HPI  PMH/PSH: DM2, RLS, OA, CVID, SONJA, Reactive airway disease  Social: Nonsmoker, rarely drinks alcohol, no drug use. Works in [employer]   FHx: Sister with connective tissue disease, maternal aunt and uncle with RA, paternal grandmother with RA. Mom and brother have less severe form of immune deficiency.     She's seen rheumatology on/off since 16 and when she moved back from [place] she started seeing Dr. Diop.      She has chronic polyarthralgias.   Since memorial day weekend, she did a lot of yard work and her joints (mostly PIPs) have been hurting since and her wrists are swollen. Pain is worse with activity. Using scissors and wrenches to open jars/packages because she has been in so much pain.   She is doing OTC aleve three times a day it was helping up until May, voltaren gel on hands and icy hot on knees and feet and it takes the edge off.   She has had this pain chronically but her symptoms have worsened since memorial day.      R shoulder, L knee, L hip started bothering her pretty bad since 2022. Pain is worse with activity and improves with rest.      AM stiffness in hands 3-4 hours.     She was started on meloxicam 15mg daily 3 weeks ago and notes improved ROM and is using tylenol 1000mg once daily and menthol creams, reports ~30% improvement. Has an appointment with Dr. Rivas in two weeks.      Patient is worried about possible infusion reactions with TNFi and is also concerned about infections.     No fever, chills, weight loss, night sweats or headaches. + dry eyes and sees ophtha. No blurry vision, redness or pain or photophobia. + dry mouth, dental loss. No loss of taste, nasal or oral ulcers, difficulty swallowing. No chest pain, palpitations, orthopnea. No shortness of breath, cough. No dysphagia, nausea, vomiting, + heartburn. No abdominal pain, constipation, + diarrhea. No melena or  hematochezia  No genital or anal ulcers. No photosensitivity, rash or lesions, + Raynaud's phenomenon a few years ago but hasn't recurred.  + chronic numbness or tingling in hands and feet  Denies history of clots (arterial or venous). Never been pregnant.     Patient Active Problem List   Diagnosis    Abdominal pain, LUQ (left upper quadrant)    Abnormal blood chemistry    Abnormal mammogram of right breast    Arthralgia    Asthma    Bacterial pneumonia    Blurred vision, bilateral    Chronic sinusitis    Circadian rhythm sleep disorder, delayed sleep phase type    Combined variable immunodeficiency (Multi)    Cough    COVID-19 virus infection    Type 2 diabetes mellitus with complication, without long-term current use of insulin (Multi)    Dry eyes    Esophageal reflux    Hypocalcemia    Hypokalemia    Idiopathic hypersomnia    Insomnia    NAFLD (nonalcoholic fatty liver disease)    Non-celiac gluten sensitivity    Obesity    Osteoarthritis, multiple sites    Other headache syndrome    Pneumonia    Premenstrual dysphoric disorder    Refractive error    Restless legs syndrome    ILD (interstitial lung disease) (Multi)    Rhinitis    Sleep disturbance    Vitamin D deficiency    Yeast infection    Abnormal liver enzymes    Chronic bronchitis (Multi)    Restrictive lung disease    Hypersomnia    Shift work sleep disorder       Past Medical History:   Diagnosis Date    Dry eye syndrome of unspecified lacrimal gland 04/01/2013    Dry eye syndrome    Other conditions influencing health status 04/01/2013    Diabetes Mellitus Poorly Controlled    Unspecified disorder of refraction 04/01/2013    Refractive disorder    Unspecified inflammation of eyelid     Eyelid inflammation       Past Surgical History:   Procedure Laterality Date    MOUTH SURGERY  04/14/2016    Oral Surgery Tooth Extraction       Social History     Socioeconomic History    Marital status: Single     Spouse name: Not on file    Number of children: Not on  file    Years of education: Not on file    Highest education level: Not on file   Occupational History    Not on file   Tobacco Use    Smoking status: Unknown    Smokeless tobacco: Not on file   Substance and Sexual Activity    Alcohol use: Not Currently    Drug use: Not on file    Sexual activity: Not on file   Other Topics Concern    Not on file   Social History Narrative    Not on file     Social Drivers of Health     Financial Resource Strain: Not on file   Food Insecurity: Not on file   Transportation Needs: Not on file   Physical Activity: Not on file   Stress: Not on file   Social Connections: Not on file   Intimate Partner Violence: Not on file   Housing Stability: Not on file       Allergies   Allergen Reactions    Hizentra [Immun Glob G(Igg)-Pro-Iga 0-50] Unknown    Immune Globulin,Gamma (Igg) Human Unknown    Octagam [Immun Globg(Igg)-Malt-Iga Ov50] Unknown    Pramipexole Unknown    Ropinirole Unknown    Sulfur Unknown    Tramadol Headache    Amoxicillin Rash    Nabumetone Rash         Current Outpatient Medications:     albuterol (Ventolin HFA) 90 mcg/actuation inhaler, Ventolin  (90 Base) MCG/ACT Inhalation Aerosol Solution  Refills: 0     Active, Disp: , Rfl:     calcium carbonate (Tums) 200 mg calcium chewable tablet, Tums CHEW  Refills: 0     Active, Disp: , Rfl:     camphor-menthoL (Tiger Balm) ointment, [place] OINT  Refills: 0     Active, Disp: , Rfl:     cholecalciferol (Vitamin D-3) 50 MCG (2000 UT) tablet, 1 tablet (2,000 Units)., Disp: , Rfl:     colostrum, bovine 500 mg capsule, Once daily, Disp: , Rfl:     diclofenac sodium (Voltaren Arthritis Pain) 1 % gel gel, Voltaren GEL  Refills: 0     Active, Disp: , Rfl:     gabapentin (Neurontin) 600 mg tablet, take 1 tablet by mouth at bedtime and increase to 2 tablets nightly the week before period (#120=90 day supply per prescriber), Disp: 120 tablet, Rfl: 3    glipiZIDE (Glucotrol) 5 mg tablet, Take 2 tablets (10 mg) by mouth 2 times a  day before meals., Disp: 360 tablet, Rfl: 3    ibuprofen 200 mg tablet, Take 1 tablet (200 mg) by mouth every 6 hours., Disp: , Rfl:     ketotifen (Zaditor) 0.025 % (0.035 %) ophthalmic solution, Zaditor 0.025 % Ophthalmic Solution  Refills: 0     Active, Disp: , Rfl:     L. acidophilus/L.bulgaricus (LACTOBACILLUS ACIDOPH-L.BULGAR ORAL), Take 2 tablets by mouth 2 times a day with meals., Disp: , Rfl:     lancets 33 gauge misc, , Disp: , Rfl:     loperamide HCl (IMODIUM ORAL), Imodium CAPS  Refills: 0     Active, Disp: , Rfl:     loratadine (Claritin) 10 mg tablet, Take 1 tablet (10 mg) by mouth once daily as needed., Disp: , Rfl:     meloxicam (Mobic) 15 mg tablet, Take 1 tablet (15 mg) by mouth once daily., Disp: 30 tablet, Rfl: 0    menthol 4 % cream, Menthol (Topical Analgesic) CREA  Refills: 0     Active, Disp: , Rfl:     mometasone (Nasonex) 50 mcg/actuation nasal spray, USE AS DIRECTED AS NEEDED - SPRING ALLERGIES, Disp: , Rfl:     mv,calcium,min/iron/folic/vitK (MULTI FOR HER ORAL), TAKE AS DIRECTED., Disp: , Rfl:     OneTouch Verio test strips strip, TEST THREE TIMES DAILY, Disp: , Rfl:     propylene glycol-glycerin (Soothe) 0.6-0.6 % dropperette ophthalmic solution, Soothe 0.6-0.6 % Ophthalmic Solution  Refills: 0     Active, Disp: , Rfl:     sertraline (Zoloft) 50 mg tablet, Take 1 tablet (50 mg) by mouth once daily., Disp: 30 tablet, Rfl: 11    sertraline (Zoloft) 50 mg tablet, Take 1 tablet (50 mg) by mouth once daily., Disp: 30 tablet, Rfl: 11    sodium chloride (Ayr) 0.65 % nasal drops, 2 SPRAYS EACH NOSTRIL AS NEEDED (WINTER), Disp: , Rfl:     zinc sulfate (Zincate) 220 (50 Zn) MG capsule, Zinc CAPS  Refills: 0     Active, Disp: , Rfl:     Objective     Visit Vitals  /74   Pulse 100     Physical Exam  Copied from previous exam  General: AAOx3, Cooperative  Head: normocephalic, atraumatic  Eyes: EOMI, conjunctiva clear, sclera white, anicteric  Throat/Mouth: No oral deformities, no cheek  swelling, mucosa appear dry  Neck/Lymph: FROM, trachea midline  Neuro: CN II-XII grossly intact, no focal deficit  Skin: No rashes, ulcers or photosensitive areas  MSK: Upper Extremities:  Hand/Fingers: ttp of R 2nd, 3rd and 4th PIP and L 2nd, 3rd, 4th and 5th PIP. No erythema, swelling, tenderness or warmth at DIP, PIP, or MCP joints, FROM grossly. Good hand . Subluxation of R 5th digit present   Wrists: TTP, warmth or tenderness, swelling at wrist b/l L>R, guarded ROM grossly.   Elbows: No tenderness, swelling, erythema or warmth at elbows, FROM grossly. No nodules   Shoulders: FROM  Lower Extremities:   Hips: No obvious deformities. L hip with positive Declan.   Knees: No tenderness, deformities, swelling, rashes, or warmth, normal ROM grossly. + crepitus more pronounced on the R.   Ankles: No deformities, tenderness, edema, erythema, ulceration, or warmth at the ankle  Feet: Negative MTP squeeze. Normal ROM grossly.   Spine: No spinal tenderness to palpation. No SI joint tenderness. Gaenslen test negative      Lab Results   Component Value Date    WBC 7.8 10/31/2024    HGB 11.4 (L) 10/31/2024    HCT 36.1 10/31/2024    MCV 77 (L) 10/31/2024     10/31/2024       Chemistry    Lab Results   Component Value Date/Time     10/31/2024 0851    K 4.6 10/31/2024 0851     10/31/2024 0851    CO2 30 10/31/2024 0851    BUN 22 10/31/2024 0851    CREATININE 0.54 10/31/2024 0851    Lab Results   Component Value Date/Time    CALCIUM 9.9 10/31/2024 0851    ALKPHOS 119 (H) 10/31/2024 0851    AST 8 (L) 10/31/2024 0851    ALT 8 10/31/2024 0851    BILITOT 0.3 10/31/2024 0851          Lab Results   Component Value Date    CRP 3.09 (H) 10/31/2024    SERA Negative 10/31/2024    JO1 <0.2 10/31/2024    CALCIUM 9.9 10/31/2024    PHOS 3.7 06/10/2021    FERRITIN 105 10/31/2024     Alkaline Phosphatase   Date Value Ref Range Status   10/31/2024 119 (H) 33 - 110 U/L Final     AST   Date Value Ref Range Status   10/31/2024 8  (L) 9 - 39 U/L Final     Urea Nitrogen   Date Value Ref Range Status   10/31/2024 22 6 - 23 mg/dL Final     Sodium   Date Value Ref Range Status   10/31/2024 141 136 - 145 mmol/L Final     Potassium   Date Value Ref Range Status   10/31/2024 4.6 3.5 - 5.3 mmol/L Final     Bicarbonate   Date Value Ref Range Status   10/31/2024 30 21 - 32 mmol/L Final     ALT   Date Value Ref Range Status   10/31/2024 8 7 - 45 U/L Final     Comment:     Patients treated with Sulfasalazine may generate falsely decreased results for ALT.       MR hand right w and wo IV contrast  Narrative: Interpreted By:  Gilmar Berger,  and Clau Muhammad   STUDY:  MRI of the  right hand without IV contrast;  12/18/2024 10:20 am      INDICATION:  Signs/Symptoms:nonspecific tenosynovitis seen on MSK US, MRI to  evaluate extent of tear and for further eval for tenosynovitis and  inflammatory arthritis.      ,M65.941 Unspecified synovitis and tenosynovitis, right hand,M65.942  Unspecified synovitis and tenosynovitis, left hand      COMPARISON:  Bilateral hand radiographs 10/31/2024  Bilateral hand ultrasound 11/19/2024      ACCESSION NUMBER(S):  [id number]      ORDERING CLINICIAN:  AUSTIN ARANA      TECHNIQUE:  MR imaging of the  right hand was obtained  with administration of  intravenous contrast medium (16 mL Dotarem).      FINDINGS:  Tendons:  The flexor tendons of the hand are intact. There is synovial  thickening of the flexor digitorum superficialis and profundus tendon  sheaths with associated surrounding T2 hyperintense signal with  postcontrast enhancement. This extends into the 3rd and 5th digit  into the fingers. There is also fluid with increased T2 signal  intensity with synovial proliferation with postcontrast enhancement  flexor pollicis longus tendon and the flexor carpi radialis and  flexor carpi ulnaris tendon sheaths. The signal changes involving the  flexor pollicis longus tendon extends into the 1st finger. There is  also  synovial thickening and surrounding T2 hyperintense signal  involving the 2nd through 6th extensor compartments. There is tearing  of the extensor carpi ulnaris tendon at the level of the wrist, with  the proximal tendon stump seen at the level of the distal ulna in the  distal tendon stump seen of the level of the base of the 5th  metacarpal with increased intrasubstance signal. There is complete  disruption of the extensor digitorum longus tendon slip to the 4th  digit of the right hand at the level of the carpals (series 11, image  45 of 64). It appears of the proximal stump can be visualized such is  seen image 45 in the axial plane and definitive distal tendon seen at  image 32 in the axial plane.      Ligaments:  The major ligamentous structures of the hand are intact.      Joints:  The carpometacarpal, metacarpophalangeal, and interphalangeal joints  are unremarkable without evidence of cartilage loss or reactive  change. There are small distal radioulnar and radiocarpal joint  effusions with thickening of the synovial lining and postcontrast  enhancement.      Osseous structures:  There is no evidence of acute fracture or dislocation. There is no  avascular necrosis. Patchy increased T2 signal intensity is seen in  the distal radius and ulna and in the carpal bones. There may be some  surface irregularity/erosion to the proximal ulnar side of the lunate  bone.      Soft tissues:  There is no evidence of muscular atrophy or tear.  No suspicious soft tissue lesions are seen.      Impression: 1. High-grade possibly complete tearing of the extensor carpi ulnaris  tendon between the level of the ulnar groove into the proximal hand.  2. 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  3. Bilateral flexor and extensor compartment tenosynovitis of the  right hand along with radiocarpal and distal radioulnar  joint  effusions with associated synovitis.  4. Patchy reactive marrow changes in the distal radius and ulna and  in the carpal bones likely related to the above impressions. There  may be an erosion involving the proximal ulnar side subchondral bone  plate of the lunate bone.  5. The constellation of these findings are compatible with  inflammatory arthropathy. Clinical and laboratory correlation is  recommended.          I personally reviewed the images/study and I agree with the findings  as stated by resident physician Dr. Sabina Mg. This  study was interpreted at [place], [place].      MACRO:  None      Signed by: Gilmar Berger 12/18/2024 3:49 PM  Dictation workstation:   [id number]    [employer] US   1. Bilateral multicompartment flexor and extensor compartment  tenosynovitis, predominantly seen as synovial proliferation in the  tendons sheaths.  2. Bilateral radiocarpal and distal radioulnar joint effusions with  synovitis.  3. Constellation of the above impressions is compatible with  nonspecific inflammatory arthropathy. Laboratory correlation is  recommended.  4. Tendinosis of the bilateral extensor carpi ulnaris tendon with  findings suspicious for at least a degree of partial tearing the  right extensor carpi ulnaris tendon at the level the wrist. A greater  degree of tearing is not excluded on the basis of this exam.  5. Findings suspicious for a degree tearing the extensor digitorum  longus tendon slip to the 4th digit of the right hand with associated  tendinosis. 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 Given all of the above impressions, rheumatoid screening protocol  MRI of the bilateral hand without and with contrast may be helpful,  including to further assess for tendon compromise.  XR R hip   FINDINGS:  There is no radiographic evidence of acute fracture or dislocation.    There is positive crossover sign bilaterally,  indicating retroverted   acetabula and a pincer-type deformity. There is note made of an IUD   seen in the pelvis.    XR R shoulder  FINDINGS:  Mild right acromioclavicular osteoarthritis. No evidence of fracture.  No erosions.      IMPRESSION:  Mild right acromioclavicular osteoarthritis.     IMPRESSION:  Bilateral acetabular retroversion compatible with pincer type   deformity of the hip joints bilaterally.      LUNGS and AIRWAYS:  The trachea and central airways are patent. No endobronchial lesion.   Stable biapical pleural scarring.  Within the lower lung bases   bilaterally there are multiple ill-defined nodular opacities   predominantly pleural-based likely of inflammatory or infectious   etiology.  These findings are stable compared to prior CT of the   chest without contrast 11/6/2014. There is no evidence of pleural   effusion or pneumothorax.  There is no evidence of air trapping on   expiratory imaging.     MEDIASTINUM and DAVID, LOWER NECK AND AXILLA:  The visualized thyroid gland is within normal limits.  No evidence of thoracic lymphadenopathy by CT criteria.  Small hiatal hernia     HEART and VESSELS:  The thoracic aorta is of normal course and caliber without vascular   calcifications.   Main pulmonary artery and its branches are normal in caliber.  No coronary artery calcifications are seen. The study is not   optimized for evaluation of coronary arteries.   The cardiac chambers are not enlarged.   No evidence of pericardial effusion.     UPPER ABDOMEN:  The spleen is enlarged measuring 14 cm.     CHEST WALL and OSSEOUS STRUCTURES:  There are no suspicious osseous lesions..  ______________     IMPRESSION:  1. Ill-defined nodular opacities at the lung bases bilaterally that   are predominantly pleural-based with nonspecific groundglass   opacities that appear similar to those seen on CT the chest from 2008   likely due to chronic inflammatory/infectious process secondary to   patient's known CVID.     2. Splenomegaly    Assessment/Plan   A 49 year old F with CVID, uncontrolled DM here for follow up of inflammatory arthritis.     SERA, MACHELLE neg, RF, CCP neg  CRP 3mg/dL, ESR 37, elevated alkaline phosphatase  Microcytic anemia, low iron, TIBC, normal ferritin    MRI R hand with high grade tear of ECU, extensor digitorum tendon slip to the 4th finger, b/l flexor and extensor tenosynovitis, radioulnar and radiocarpal joint effusions with synovial thickening, some  surface irregularity/erosion to the proximal ulnar side of the lunate  bone.     Hip XR with b/l CAM deformities from 2016.      She also reports sicca symptoms with dry mucous membranes. No glandular swelling.     - Discussed risks/benefits in depth of TNFi vs csDMARDs. We also discussed that I would recommend initiating a TNFi with severity of imaging findings and presence of possible erosions to prevent further joint damage and improve outcomes. Patient would like to discuss with her allergist/immunologist and will update me prior to starting prior auth. We will plan to start methotrexate 15mg/week after she gets her TB and hepatitis test done. Counseled on risks/benefits of methotrexate and other csDMARDs. Counseled on contraception with methotrexate, she is not sexually active.   - Labs today  - XR today of SI joint, she has CAM deformities and a positive GLORIA but will also evaluate for sacroilitis.      RTC in 6 weeks to see response on methotrexate     Pedrito Masterson MD  Division of Rheumatology   [facility]

## 2025-01-06 ENCOUNTER — OFFICE VISIT (OUTPATIENT)
Dept: RHEUMATOLOGY | Facility: CLINIC | Age: 50
End: 2025-01-06
Payer: COMMERCIAL

## 2025-01-06 ENCOUNTER — LAB (OUTPATIENT)
Dept: LAB | Facility: LAB | Age: 50
End: 2025-01-06
Payer: COMMERCIAL

## 2025-01-06 ENCOUNTER — HOSPITAL ENCOUNTER (OUTPATIENT)
Dept: RADIOLOGY | Facility: CLINIC | Age: 50
Discharge: HOME | End: 2025-01-06
Payer: COMMERCIAL

## 2025-01-06 VITALS
DIASTOLIC BLOOD PRESSURE: 74 MMHG | WEIGHT: 180 LBS | OXYGEN SATURATION: 98 % | SYSTOLIC BLOOD PRESSURE: 158 MMHG | HEART RATE: 100 BPM | BODY MASS INDEX: 29.05 KG/M2

## 2025-01-06 DIAGNOSIS — M19.90 INFLAMMATORY ARTHRITIS: ICD-10-CM

## 2025-01-06 DIAGNOSIS — M25.50 POLYARTHRALGIA: Primary | ICD-10-CM

## 2025-01-06 DIAGNOSIS — M25.851 FEMOROACETABULAR IMPINGEMENT OF BOTH HIPS: ICD-10-CM

## 2025-01-06 DIAGNOSIS — M25.852 FEMOROACETABULAR IMPINGEMENT OF BOTH HIPS: ICD-10-CM

## 2025-01-06 DIAGNOSIS — D83.9 CVID (COMMON VARIABLE IMMUNODEFICIENCY): ICD-10-CM

## 2025-01-06 LAB
ALBUMIN SERPL BCP-MCNC: 4 G/DL (ref 3.4–5)
ALP SERPL-CCNC: 84 U/L (ref 33–110)
ALT SERPL W P-5'-P-CCNC: 8 U/L (ref 7–45)
ANION GAP SERPL CALC-SCNC: 16 MMOL/L (ref 10–20)
AST SERPL W P-5'-P-CCNC: 10 U/L (ref 9–39)
BASOPHILS # BLD AUTO: 0.03 X10*3/UL (ref 0–0.1)
BASOPHILS NFR BLD AUTO: 0.4 %
BILIRUB SERPL-MCNC: 0.3 MG/DL (ref 0–1.2)
BUN SERPL-MCNC: 15 MG/DL (ref 6–23)
CALCIUM SERPL-MCNC: 9.3 MG/DL (ref 8.6–10.6)
CHLORIDE SERPL-SCNC: 103 MMOL/L (ref 98–107)
CO2 SERPL-SCNC: 26 MMOL/L (ref 21–32)
CREAT SERPL-MCNC: 0.61 MG/DL (ref 0.5–1.05)
CRP SERPL-MCNC: 2.84 MG/DL
EGFRCR SERPLBLD CKD-EPI 2021: >90 ML/MIN/1.73M*2
EOSINOPHIL # BLD AUTO: 0.17 X10*3/UL (ref 0–0.7)
EOSINOPHIL NFR BLD AUTO: 2.3 %
ERYTHROCYTE [DISTWIDTH] IN BLOOD BY AUTOMATED COUNT: 15.5 % (ref 11.5–14.5)
ERYTHROCYTE [SEDIMENTATION RATE] IN BLOOD BY WESTERGREN METHOD: 23 MM/H (ref 0–20)
GLUCOSE SERPL-MCNC: 201 MG/DL (ref 74–99)
HBV CORE AB SER QL: NONREACTIVE
HBV SURFACE AB SER-ACNC: <3.1 MIU/ML
HBV SURFACE AG SERPL QL IA: NONREACTIVE
HCT VFR BLD AUTO: 36.6 % (ref 36–46)
HCV AB SER QL: NONREACTIVE
HGB BLD-MCNC: 11.3 G/DL (ref 12–16)
IMM GRANULOCYTES # BLD AUTO: 0.07 X10*3/UL (ref 0–0.7)
IMM GRANULOCYTES NFR BLD AUTO: 1 % (ref 0–0.9)
LYMPHOCYTES # BLD AUTO: 0.85 X10*3/UL (ref 1.2–4.8)
LYMPHOCYTES NFR BLD AUTO: 11.6 %
MCH RBC QN AUTO: 23.2 PG (ref 26–34)
MCHC RBC AUTO-ENTMCNC: 30.9 G/DL (ref 32–36)
MCV RBC AUTO: 75 FL (ref 80–100)
MONOCYTES # BLD AUTO: 0.5 X10*3/UL (ref 0.1–1)
MONOCYTES NFR BLD AUTO: 6.8 %
NEUTROPHILS # BLD AUTO: 5.7 X10*3/UL (ref 1.2–7.7)
NEUTROPHILS NFR BLD AUTO: 77.9 %
NRBC BLD-RTO: 0 /100 WBCS (ref 0–0)
PLATELET # BLD AUTO: 317 X10*3/UL (ref 150–450)
POTASSIUM SERPL-SCNC: 4.5 MMOL/L (ref 3.5–5.3)
PROT SERPL-MCNC: 5.7 G/DL (ref 6.4–8.2)
RBC # BLD AUTO: 4.88 X10*6/UL (ref 4–5.2)
SODIUM SERPL-SCNC: 140 MMOL/L (ref 136–145)
WBC # BLD AUTO: 7.3 X10*3/UL (ref 4.4–11.3)

## 2025-01-06 PROCEDURE — 86704 HEP B CORE ANTIBODY TOTAL: CPT

## 2025-01-06 PROCEDURE — 85025 COMPLETE CBC W/AUTO DIFF WBC: CPT

## 2025-01-06 PROCEDURE — 36415 COLL VENOUS BLD VENIPUNCTURE: CPT

## 2025-01-06 PROCEDURE — 99215 OFFICE O/P EST HI 40 MIN: CPT | Performed by: INTERNAL MEDICINE

## 2025-01-06 PROCEDURE — 86706 HEP B SURFACE ANTIBODY: CPT

## 2025-01-06 PROCEDURE — 80053 COMPREHEN METABOLIC PANEL: CPT

## 2025-01-06 PROCEDURE — 72202 X-RAY EXAM SI JOINTS 3/> VWS: CPT

## 2025-01-06 PROCEDURE — 72202 X-RAY EXAM SI JOINTS 3/> VWS: CPT | Performed by: RADIOLOGY

## 2025-01-06 PROCEDURE — 86803 HEPATITIS C AB TEST: CPT

## 2025-01-06 PROCEDURE — 85652 RBC SED RATE AUTOMATED: CPT

## 2025-01-06 PROCEDURE — 3078F DIAST BP <80 MM HG: CPT | Performed by: INTERNAL MEDICINE

## 2025-01-06 PROCEDURE — 86481 TB AG RESPONSE T-CELL SUSP: CPT

## 2025-01-06 PROCEDURE — 86140 C-REACTIVE PROTEIN: CPT

## 2025-01-06 PROCEDURE — 87340 HEPATITIS B SURFACE AG IA: CPT

## 2025-01-06 PROCEDURE — 3077F SYST BP >= 140 MM HG: CPT | Performed by: INTERNAL MEDICINE

## 2025-01-06 ASSESSMENT — ENCOUNTER SYMPTOMS
OCCASIONAL FEELINGS OF UNSTEADINESS: 0
DEPRESSION: 0
LOSS OF SENSATION IN FEET: 0

## 2025-01-06 ASSESSMENT — PAIN SCALES - GENERAL: PAINLEVEL_OUTOF10: 4

## 2025-01-06 NOTE — PATIENT INSTRUCTIONS
Methotrexate (Rheumatrex, Trexall, Otrexup, Rasuvo)  Methotrexate is one of the most effective and commonly used medications in the treatment of rheumatoid arthritis and other forms of inflammatory arthritis. It may also be used to treat lupus, inflammatory myositis, vasculitis, psoriasis, scleroderma, and some forms of childhood arthritis. It is known as a disease-modifying anti-rheumatic drug (DMARD), because it not only decreases the pain and swelling of arthritis, but it also can decrease damage to joints and long-term disability.    How To Take It  Methotrexate comes either as pills or as a subcutaneous injection. Methotrexate is usually taken as a single dose once per week, although occasionally the dose is split into two to improve absorption or avoid side effects. Your doctor also may prescribe a folic acid (or folate) supplement to decrease the chance of side effects. Alcohol should be avoided as it can significantly increase the risk for liver damage while taking methotrexate. Contraception is highly encouraged while on Methotrexate as this medication can cause fetal abnormalities. Regular laboratory monitoring is required while taking methotrexate. Symptomatic improvement can be noted within three to six weeks of initiating therapy, but full benefit of this drug may take up to 12 weeks.    Side Effects  Methotrexate can lower the ability of your immune system to fight infections. If you develop an infection while using this medication, you should stop it and contact your doctor. The most common side effects are gastrointestinal upset and elevations of liver function tests.    The most common side effects are nausea and diarrhea. Mouth sores, rash, dizziness, and abnormalities in blood counts can also be seen but are less common.    Methotrexate rarely can cause liver issues, most likely to occur in patients who already have liver problems or are using alcohol or taking other drugs that are toxic to the  liver. Slow hair loss is seen in some patients but reverses once medication is stopped. This can often be managed by taking folic acid. It is important to remember that most patients do not experience side effects.    Tell Your Rheumatology Provider  You should contact your rheumatology provider if you develop symptoms of an infection, such as a fever or cough, or if you think you are having any side effects. Be sure to let your rheumatology provider know if you are pregnant, planning to get pregnant, or if you are breastfeeding. Methotrexate treatment should be discontinued for at least three months before attempting to become pregnant. Men taking methotrexate should talk to their physician prior to attempts to conceive. If you are planning on having surgery or will be receiving chemotherapy or radiation therapy, talk to your rheumatology provider first.    Updated February 2024 by Yoana Chicas MD, and reviewed by the [employer] Committee on Communications and Marketing.    This patient fact sheet is provided for general education only. Individuals should consult a qualified health care provider for professional medical advice, diagnosis, and treatment of a medical or health condition.     Hydroxychloroquine (Plaquenil)  Hydroxychloroquine (Plaquenil) is a disease-modifying anti-rheumatic drug (DMARD). It can decrease the pain and swelling of arthritis which can prevent joint damage leading to long-term disability. Hydroxychloroquine is in a class of medications that was first used to prevent and treat malaria but today is the standard drug in lupus. It can be used in many other autoimmune diseases. It is not fully clear of its mechanism of action, but the current thought is that it interferes with communication in the immune system.    How to Take It  Hydroxychloroquine comes in an oral tablet. Adult dosing for rheumatic diseases ranges from 200 mg to 400 mg per day (typically 5 mg/kg,  maximum 400 mg daily). In some cases, higher doses are used. It can be taken as a single daily dose or in 2 divided doses if taking more than one tablet. It is recommended to be taken with food as some of the gastrointestinal side effects can be alleviated with food and fats. Symptoms can start to improve in one to two months, but it may take up to six months before the full benefits of this medication are experienced.    Side Effects  Hydroxychloroquine typically is very well tolerated. The most common side effects are nausea and diarrhea, which often improve with time. Less common side effects include rash, hair changes, and muscle weakness. Rarely, hydroxychloroquine can lead to anemia in some individuals. This can happen in individuals with a condition known as G6PD deficiency or porphyria.    In rare cases, hydroxychloroquine can cause visual changes or loss of vision. Such problems are more likely to occur in individuals taking high doses for many years, in individuals 60 years or older, those with significant kidney or liver disease, and those with underlying retinal disease. At the recommended dose, the development of visual problems due to the medication is rare. It is recommended that you have an eye exam within the first year of use, then repeat every 1 to 5 years based on current guidelines.    Additional rare reports of changes in the heart rhythm have been reported with the use of hydroxychloroquine, particularly in combination with other medications. While monitoring for this risk is not typical in the office setting, it has been indicated in hospitalized and critically ill patients to evaluate for interactions with other medications.    Tell Your Rheumatology Provider  Although there are few drug interactions with hydroxychloroquine, be sure to tell your rheumatology provider about all the medications you are taking, including over-the-counter drugs and natural remedies.    Be sure to notify your  other providers when taking this drug. This drug is not known to suppress your immune system but rather to alter some functions within it. Vaccines recommended for healthcare maintenance are generally acceptable. Notify your eye doctor when you are on this medication so regular visual screening tests can be performed. If you are pregnant, considering becoming pregnant, or lactating, please discuss with your rheumatology provider that you are taking this medication. Hydroxychloroquine has been shown to be safe during pregnancy and breastfeeding.    Updated February 2024 by Donnell Jackson MD, and reviewed by the [employer] College [employer] Committee on Communications and Marketing.    This information is provided for general education only. Individuals should consult a qualified healthcare provider for professional medical advice, diagnosis, and treatment of a medical or health condition.     Leflunomide (Arava)  Leflunomide (Arava) is a drug approved to treat adults with moderate to severe rheumatoid arthritis along with other rheumatic diseases. It belongs to a class of medications called disease-modifying antirheumatic drugs (DMARDs), which aim to decrease inflammation and damage.    Leflunomide blocks the formation of DNA, which is important for replicating cells, such as those in the immune system. It suppresses the immune system to reduce inflammation, thereby reducing pain and swelling.    How To Take It  Leflunomide usually is given as a 20 mg tablet once a day. Sometimes, patients are given 10 mg, if they experienced side effects with the higher dose. Leflunomide should be taken with food. Complete benefits may not be experienced until 6 to12 weeks after starting the medication.    It is important that you have regular blood tests, including those for liver function and blood counts, while taking this medication. You should not take leflunomide if you have a pre-existing liver disease, such as  hepatitis or cirrhosis. Alcohol use should be avoided when taking lefunomide as this may increase the risk of liver damage. This medication should also be avoided in pregnancy and contraception is highly recommended in female patients taking lefunomide.    Side Effects  The most common side effect of leflunomide is diarrhea, which occurs in approximately 20% of patients. This symptom frequently improves with time or by taking a medication to prevent diarrhea. If diarrhea persists, the dose of leflunomide may need to be reduced.    Less common side effects include nausea, stomach pain, indigestion, rash, and hair loss. In fewer than 10% of patients, leflunomide can cause abnormal liver function tests or decreased blood cell or platelet counts. Rarely, this drug may cause lung problems, such as cough, shortness of breath or lung injury.    Tell Your Rheumatology Provider  If you are pregnant or are considering having a child, you should discuss this with your rheumatology provider before beginning leflunomide as this medication can cause serious birth defects. Breastfeeding while taking leflunomide is not recommended. Use of an effective form of birth control is critical throughout the course of this treatment. Men taking leflunomide who wish to have a child also should talk with their rheumatology provider about possible risks. Cholestyramine is a medication you can take to help remove leflunomide from your system.    Live vaccinations should be avoided while taking this medication. Be sure to discuss any vaccines with your rheumatology provider before receiving them.    It is important you inform your rheumatologist if you have any infections or are planning to undergo any surgeries as leflunomide can interfere with recovery.    Updated February 2024 by Yoana Chicas MD, and reviewed by the [employer] Committee on Communications and Marketing. This information is provided for general education  only.    Individuals should consult a qualified health care provider for professional medical advice, diagnosis, and treatment of a medical or health condition.

## 2025-01-08 LAB
NIL(NEG) CONTROL SPOT COUNT: NORMAL
PANEL A SPOT COUNT: 1
PANEL B SPOT COUNT: 3
POS CONTROL SPOT COUNT: NORMAL
T-SPOT. TB INTERPRETATION: NEGATIVE

## 2025-01-09 DIAGNOSIS — M19.90 INFLAMMATORY ARTHRITIS: Primary | ICD-10-CM

## 2025-01-09 RX ORDER — METHOTREXATE 2.5 MG/1
15 TABLET ORAL WEEKLY
Qty: 24 TABLET | Refills: 0 | Status: SHIPPED | OUTPATIENT
Start: 2025-01-09 | End: 2025-02-08

## 2025-01-09 RX ORDER — FOLIC ACID 1 MG/1
1 TABLET ORAL DAILY
Qty: 90 TABLET | Refills: 0 | Status: SHIPPED | OUTPATIENT
Start: 2025-01-09 | End: 2025-04-09

## 2025-01-13 DIAGNOSIS — M19.90 INFLAMMATORY ARTHRITIS: ICD-10-CM

## 2025-01-16 ENCOUNTER — APPOINTMENT (OUTPATIENT)
Dept: ORTHOPEDIC SURGERY | Facility: CLINIC | Age: 50
End: 2025-01-16
Payer: COMMERCIAL

## 2025-01-16 DIAGNOSIS — M19.90 INFLAMMATORY ARTHRITIS: Primary | ICD-10-CM

## 2025-01-16 PROCEDURE — 99204 OFFICE O/P NEW MOD 45 MIN: CPT | Performed by: ORTHOPAEDIC SURGERY

## 2025-01-16 NOTE — Clinical Note
January 17, 2025     No Recipients    Patient: Harriet Russo   YOB: 1975   Date of Visit: 1/16/2025       Dear Dr. Adam Recipients:    Thank you for referring Harriet Russo to me for evaluation. Below are my notes for this consultation.  If you have questions, please do not hesitate to call me. I look forward to following your patient along with you.       Sincerely,     Adryan Rivas MD      CC: No Recipients  ______________________________________________________________________________________

## 2025-01-16 NOTE — PROGRESS NOTES
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  There does appear to be a complete ECU rupture on the right, on both the MRI and ultrasound.  Generally this does not need a formal reconstruction.  There is some attenuation, but not complete rupture clinically, of the ring EDC.  I reviewed how to perform monthly self tests of the finger extensor tendons.  If any of these should start the leg down, please reach out to my office so that surgical cleanout and reconstruction can be arranged.  I reviewed that the primary management of    Inflammatory arthritis is medical and not surgical.  I am glad you are in with the rheumatologist and starting methotrexate, as I think this is likely to help.    Follow-up with me with any additional concerns.        HISTORY OF PRESENT ILLNESS       Patient is a 49 y.o. right-hand dominant female , who presents today for evaluation of inflammatory tendinopathy of the right hand and wrist.  She just started with Dr. Masterson in the Rheumatology, and just started on methotrexate.  She is here to discuss the tendinopathy noted on ultrasound and MRI.  She does have some pain and swelling, but is not noticing any significant functional deficits.    She is diabetic but not hypothyroid.  She does not smoke.  She does have NAFLD.      REVIEW OF SYSTEMS       A 30-item multi-system Review Of Systems was obtained on today's intake form.  This was reviewed with the patient and is correct.  The pertinent positives and negatives are listed above.  The form has been scanned separately into the medical record.      PHYSICAL EXAM    Constitutional:    Appears stated age. Well-developed and well-nourished female in no acute distress.  Psychiatric:         Pleasant normal mood and affect. Behavior is appropriate  for the situation.   Head:                   Normocephalic and atraumatic.  Eyes:                    Pupils are equal and round.  Cardiovascular:  2+ radial and ulnar pulses. Fingers well-perfused.  Respiratory:        Effort normal. No respiratory distress. Speaking in complete sentences.  Neurologic:       Alert and oriented to person, place, and time.  Skin:                Skin is intact, warm and dry.  Hematologic / Lymphatic:    No lymphedema or lymphangitis.    Extremities / Musculoskeletal:                      Skin of the right hand and wrist is intact with no erythema, ecchymosis, or diffuse swelling.  Normal skin drag and coloration.  Good composite finger flexion extension with intact intrinsic plus minus posture.  There is some fourth compartment tenosynovitis with a positive tuck sign.  Negative midcarpal shift.  Negative Dsouza.  Stable DRUJ in all stations.  No significant discomfort on ECU Synergy test.  Good sagittal plane balance.  Normal resting cascade.  Sensation intact to light touch in all distributions.  Capillary refill less than 2 seconds.      IMAGING / LABS / EMGs           Ultrasound of bilateral hand and wrist from November 19 and MRI right hand from December 18 were reviewed and independently interpreted by me today.  These confirmed the fourth compartment tenosynovitis.  There is a complete ECU rupture on the right.  There is some flexor side tenosynovitis especially around right FPL but no ruptures on that side.  EDC-ring right is attenuated though not clearly ruptured.  Clinically it is intact.  No acute fractures.      Past Medical History:   Diagnosis Date    Dry eye syndrome of unspecified lacrimal gland 04/01/2013    Dry eye syndrome    Other conditions influencing health status 04/01/2013    Diabetes Mellitus Poorly Controlled    Unspecified disorder of refraction 04/01/2013    Refractive disorder    Unspecified inflammation of eyelid     Eyelid inflammation       Medication  Documentation Review Audit       Reviewed by Corina Heredia MA (Medical Assistant) on 25 at 1035      Medication Order Taking? Sig Documenting Provider Last Dose Status   albuterol (Ventolin HFA) 90 mcg/actuation inhaler [id number] Yes Ventolin  (90 Base) MCG/ACT Inhalation Aerosol Solution   Refills: 0       Active Historical Provider, MD  Active   calcium carbonate (Tums) 200 mg calcium chewable tablet [id number] Yes Tums CHEW   Refills: 0       Active Historical Provider, MD  Active   camphor-menthoL (Tiger Balm) ointment [id number] Yes [place] OINT   Refills: 0       Active Historical Provider, MD  Active   cholecalciferol (Vitamin D-3) 50 MCG (2000 UT) tablet [id number] Yes 1 tablet (2,000 Units). Historical Provider, MD  Active   colostrum, bovine 500 mg capsule [id number] Yes Once daily Historical Provider, MD  Active   diclofenac sodium (Voltaren Arthritis Pain) 1 % gel gel [id number] Yes Voltaren GEL   Refills: 0       Active Historical Provider, MD  Active   gabapentin (Neurontin) 600 mg tablet [id number] Yes take 1 tablet by mouth at bedtime and increase to 2 tablets nightly the week before period (#120=90 day supply per prescriber) Alejandro Obrien MD  Active   glipiZIDE (Glucotrol) 5 mg tablet [id number]  Take 2 tablets (10 mg) by mouth 2 times a day before meals. Shlomo Sepulveda MD   24 2359   ibuprofen 200 mg tablet [id number] Yes Take 1 tablet (200 mg) by mouth every 6 hours. Historical Provider, MD  Active   ketotifen (Zaditor) 0.025 % (0.035 %) ophthalmic solution [id number] Yes Zaditor 0.025 % Ophthalmic Solution   Refills: 0       Active Historical Provider, MD  Active   L. acidophilus/L.bulgaricus (LACTOBACILLUS ACIDOPH-L.BULGAR ORAL) [id number] Yes Take 2 tablets by mouth 2 times a day with meals. Historical Provider, MD  Active   lancets 33 gauge misc [id number] Yes  Historical Provider, MD  Active   loperamide HCl (IMODIUM ORAL) [id number] Yes Imodium CAPS   Refills: 0       Active  Historical Provider, MD  Active   loratadine (Claritin) 10 mg tablet [id number] Yes Take 1 tablet (10 mg) by mouth once daily as needed. Historical Provider, MD  Active   meloxicam (Mobic) 15 mg tablet [id number] Yes Take 1 tablet (15 mg) by mouth once daily. Pedrito Masterson MD  Active   menthol 4 % cream [id number] Yes Menthol (Topical Analgesic) CREA   Refills: 0       Active Historical Provider, MD  Active   mometasone (Nasonex) 50 mcg/actuation nasal spray [id number] Yes USE AS DIRECTED AS NEEDED - SPRING ALLERGIES Historical Provider, MD  Active   mv,calcium,min/iron/folic/vitK (MULTI FOR HER ORAL) [id number] Yes TAKE AS DIRECTED. Historical Provider, MD  Active   OneTouch Verio test strips strip [id number] Yes TEST THREE TIMES DAILY Historical Provider, MD  Active   propylene glycol-glycerin (Soothe) 0.6-0.6 % dropperette ophthalmic solution [id number] Yes Soothe 0.6-0.6 % Ophthalmic Solution   Refills: 0       Active Historical Provider, MD  Active   sertraline (Zoloft) 50 mg tablet [id number] No Take 1 tablet (50 mg) by mouth once daily. Bill Hicks MD Taking  24 2359   sertraline (Zoloft) 50 mg tablet [id number] Yes Take 1 tablet (50 mg) by mouth once daily. Bill Hicks MD  Active   sodium chloride (Ayr) 0.65 % nasal drops [id number] Yes 2 SPRAYS EACH NOSTRIL AS NEEDED (WINTER) Historical Provider, MD  Active   zinc sulfate (Zincate) 220 (50 Zn) MG capsule [id number] Yes Zinc CAPS   Refills: 0       Active Historical Provider, MD  Active                    Allergies   Allergen Reactions    Hizentra [Immun Glob G(Igg)-Pro-Iga 0-50] Unknown    Immune Globulin,Gamma (Igg) Human Unknown    Octagam [Immun Globg(Igg)-Malt-Iga Ov50] Unknown    Pramipexole Unknown    Ropinirole Unknown    Sulfur Unknown    Tramadol Headache    Amoxicillin Rash    Nabumetone Rash       Social History     Socioeconomic History    Marital status: Single     Spouse name: Not on file    Number of children: Not on file    Years of  education: Not on file    Highest education level: Not on file   Occupational History    Not on file   Tobacco Use    Smoking status: Unknown    Smokeless tobacco: Not on file   Substance and Sexual Activity    Alcohol use: Not Currently    Drug use: Not on file    Sexual activity: Not on file   Other Topics Concern    Not on file   Social History Narrative    Not on file     Social Drivers of Health     Financial Resource Strain: Not on file   Food Insecurity: Not on file   Transportation Needs: Not on file   Physical Activity: Not on file   Stress: Not on file   Social Connections: Not on file   Intimate Partner Violence: Not on file   Housing Stability: Not on file       Past Surgical History:   Procedure Laterality Date    MOUTH SURGERY  04/14/2016    Oral Surgery Tooth Extraction         Electronically Signed      THERESA Rivas MD      Orthopaedic Hand Surgery      [phone]

## 2025-01-27 DIAGNOSIS — M25.50 POLYARTHRALGIA: Primary | ICD-10-CM

## 2025-01-27 DIAGNOSIS — M19.90 INFLAMMATORY ARTHRITIS: ICD-10-CM

## 2025-01-27 RX ORDER — MELOXICAM 15 MG/1
15 TABLET ORAL DAILY
Qty: 90 TABLET | Refills: 0 | Status: SHIPPED | OUTPATIENT
Start: 2025-01-27 | End: 2025-04-27

## 2025-02-07 DIAGNOSIS — M19.90 INFLAMMATORY ARTHRITIS: ICD-10-CM

## 2025-02-08 LAB
ALBUMIN SERPL-MCNC: 4.1 G/DL (ref 3.6–5.1)
ALP SERPL-CCNC: 79 U/L (ref 31–125)
ALT SERPL-CCNC: 9 U/L (ref 6–29)
ANION GAP SERPL CALCULATED.4IONS-SCNC: 7 MMOL/L (CALC) (ref 7–17)
AST SERPL-CCNC: 8 U/L (ref 10–35)
BASOPHILS # BLD AUTO: 32 CELLS/UL (ref 0–200)
BASOPHILS NFR BLD AUTO: 0.5 %
BILIRUB SERPL-MCNC: 0.3 MG/DL (ref 0.2–1.2)
BUN SERPL-MCNC: 17 MG/DL (ref 7–25)
CALCIUM SERPL-MCNC: 8.9 MG/DL (ref 8.6–10.2)
CHLORIDE SERPL-SCNC: 105 MMOL/L (ref 98–110)
CO2 SERPL-SCNC: 26 MMOL/L (ref 20–32)
CREAT SERPL-MCNC: 0.59 MG/DL (ref 0.5–0.99)
EGFRCR SERPLBLD CKD-EPI 2021: 111 ML/MIN/1.73M2
EOSINOPHIL # BLD AUTO: 246 CELLS/UL (ref 15–500)
EOSINOPHIL NFR BLD AUTO: 3.9 %
ERYTHROCYTE [DISTWIDTH] IN BLOOD BY AUTOMATED COUNT: 17 % (ref 11–15)
GLUCOSE SERPL-MCNC: 212 MG/DL (ref 65–99)
HCT VFR BLD AUTO: 35.2 % (ref 35–45)
HGB BLD-MCNC: 11 G/DL (ref 11.7–15.5)
LYMPHOCYTES # BLD AUTO: 1046 CELLS/UL (ref 850–3900)
LYMPHOCYTES NFR BLD AUTO: 16.6 %
MCH RBC QN AUTO: 23.4 PG (ref 27–33)
MCHC RBC AUTO-ENTMCNC: 31.3 G/DL (ref 32–36)
MCV RBC AUTO: 74.9 FL (ref 80–100)
MONOCYTES # BLD AUTO: 447 CELLS/UL (ref 200–950)
MONOCYTES NFR BLD AUTO: 7.1 %
NEUTROPHILS # BLD AUTO: 4530 CELLS/UL (ref 1500–7800)
NEUTROPHILS NFR BLD AUTO: 71.9 %
PLATELET # BLD AUTO: 289 THOUSAND/UL (ref 140–400)
PMV BLD REES-ECKER: 10 FL (ref 7.5–12.5)
POTASSIUM SERPL-SCNC: 4.7 MMOL/L (ref 3.5–5.3)
PROT SERPL-MCNC: 5.4 G/DL (ref 6.1–8.1)
RBC # BLD AUTO: 4.7 MILLION/UL (ref 3.8–5.1)
SODIUM SERPL-SCNC: 138 MMOL/L (ref 135–146)
WBC # BLD AUTO: 6.3 THOUSAND/UL (ref 3.8–10.8)

## 2025-02-15 PROBLEM — M19.90 INFLAMMATORY ARTHRITIS: Status: ACTIVE | Noted: 2025-02-15

## 2025-02-17 ENCOUNTER — APPOINTMENT (OUTPATIENT)
Dept: ENDOCRINOLOGY | Facility: CLINIC | Age: 50
End: 2025-02-17
Payer: COMMERCIAL

## 2025-02-17 VITALS
BODY MASS INDEX: 28.08 KG/M2 | SYSTOLIC BLOOD PRESSURE: 151 MMHG | WEIGHT: 174 LBS | HEART RATE: 96 BPM | DIASTOLIC BLOOD PRESSURE: 88 MMHG

## 2025-02-17 DIAGNOSIS — E11.8 TYPE 2 DIABETES MELLITUS WITH COMPLICATION, WITHOUT LONG-TERM CURRENT USE OF INSULIN (MULTI): ICD-10-CM

## 2025-02-17 DIAGNOSIS — F32.81 PREMENSTRUAL DYSPHORIC DISORDER: ICD-10-CM

## 2025-02-17 LAB — POC HEMOGLOBIN A1C: 7.9 % (ref 4.2–6.5)

## 2025-02-17 PROCEDURE — 3079F DIAST BP 80-89 MM HG: CPT | Performed by: INTERNAL MEDICINE

## 2025-02-17 PROCEDURE — 3077F SYST BP >= 140 MM HG: CPT | Performed by: INTERNAL MEDICINE

## 2025-02-17 PROCEDURE — 83036 HEMOGLOBIN GLYCOSYLATED A1C: CPT | Performed by: INTERNAL MEDICINE

## 2025-02-17 PROCEDURE — 99214 OFFICE O/P EST MOD 30 MIN: CPT | Performed by: INTERNAL MEDICINE

## 2025-02-17 RX ORDER — ACETAMINOPHEN 500 MG
1000 TABLET ORAL EVERY 6 HOURS PRN
COMMUNITY

## 2025-02-17 RX ORDER — LIDOCAINE 40 MG/G
CREAM TOPICAL 4 TIMES DAILY PRN
COMMUNITY

## 2025-02-17 RX ORDER — SERTRALINE HYDROCHLORIDE 50 MG/1
50 TABLET, FILM COATED ORAL DAILY
Qty: 30 TABLET | Refills: 11 | COMMUNITY
Start: 2025-02-17 | End: 2025-02-17

## 2025-02-17 NOTE — PROGRESS NOTES
History Of Present Illness  Harriet Russo is a 49 y.o. female     Duration of type 2 diabetes mellitus:  14 years  Complications:  none     Glipizide 2.5 mg TID.     Prior use of Mounjaro, stopped due to cost.  She is aware cost for Ozempic would be the same.  Intolerant to metformin.  History of polyuria and vaginal candidiasis on Invokana  Prior use of Victoza, without recall of side effect.     Patient is testing glucose 1 time daily  Records reviewed, on file     Last eye exam:  November 2023    Past Medical History  She has a past medical history of Dry eye syndrome of unspecified lacrimal gland (04/01/2013), Other conditions influencing health status (04/01/2013), Unspecified disorder of refraction (04/01/2013), and Unspecified inflammation of eyelid.    Surgical History  She has a past surgical history that includes Mouth surgery (04/14/2016).     Social History  She reports that she does not currently use alcohol. No history on file for tobacco use and drug use.    Family History  Family History   Problem Relation Name Age of Onset    No Known Problems Mother      Atrial fibrillation Father      Mitral valve prolapse Father      Ovarian cancer Maternal Great-Grandmother  65    Glaucoma Other grandfather     Other (compressed vertebrea) Other      Arthritis Other      Other (adverse food reaction) Other family         not anaphylactic    Other (anaphylaxis due to bee venom) Other family     Other (complain of allergic reaction seasonal) Other family     Heart failure Other family     Eczema Other family     Emphysema Other family     Other (hypogammaglobulinemia) Other family     Irritable bowel syndrome Other family     Other (nasal polyps) Other family     Breast cancer Neg Hx         Medications  Current Outpatient Medications   Medication Instructions    acetaminophen (TYLENOL) 1,000 mg, Every 6 hours PRN    albuterol (Ventolin HFA) 90 mcg/actuation inhaler Ventolin  (90 Base) MCG/ACT Inhalation  Aerosol Solution   Refills: 0       Active    calcium carbonate (Tums) 200 mg calcium chewable tablet Tums CHEW   Refills: 0       Active    camphor-menthoL (Tiger Balm) ointment [place] OINT   Refills: 0       Active    cholecalciferol (Vitamin D-3) 50 MCG (2000 UT) tablet 1 tablet (2,000 Units).    colostrum, bovine 500 mg capsule Once daily    diclofenac sodium (Voltaren Arthritis Pain) 1 % gel gel Voltaren GEL   Refills: 0       Active    folic acid (FOLVITE) 1 mg, oral, Daily    gabapentin (Neurontin) 600 mg tablet take 1 tablet by mouth at bedtime and increase to 2 tablets nightly the week before period (#120=90 day supply per prescriber)    glipiZIDE (GLUCOTROL) 10 mg, oral, 2 times daily before meals    ketotifen (Zaditor) 0.025 % (0.035 %) ophthalmic solution Zaditor 0.025 % Ophthalmic Solution   Refills: 0       Active    L. acidophilus/L.bulgaricus (LACTOBACILLUS ACIDOPH-L.BULGAR ORAL) 2 tablets, 2 times daily (morning and late afternoon)    lancets 33 gauge misc No dose, route, or frequency recorded.    lidocaine (LMX) 4 % cream 4 times daily PRN    loperamide HCl (IMODIUM ORAL) Imodium CAPS   Refills: 0       Active    loratadine (Claritin) 10 mg tablet 1 tablet, Daily PRN    mag/aluminum/sod bicarb/alginc (GAVISCON ORAL) Take by mouth.    meloxicam (MOBIC) 15 mg, oral, Daily    menthol 4 % cream Menthol (Topical Analgesic) CREA   Refills: 0       Active    methotrexate (TREXALL) 15 mg, oral, Weekly, Follow directions carefully, and ask to explain any part you do not understand. Take exactly as directed.    mometasone (Nasonex) 50 mcg/actuation nasal spray USE AS DIRECTED AS NEEDED - SPRING ALLERGIES    mv,calcium,min/iron/folic/vitK (MULTI FOR HER ORAL) TAKE AS DIRECTED.    OneTouch Verio test strips strip TEST THREE TIMES DAILY    propylene glycol-glycerin (Soothe) 0.6-0.6 % dropperette ophthalmic solution Soothe 0.6-0.6 % Ophthalmic Solution   Refills: 0       Active    sertraline (ZOLOFT) 50 mg,  oral, Daily    sertraline (ZOLOFT) 50 mg, oral, Daily    sertraline (ZOLOFT) 50 mg, Daily    sodium chloride (Ayr) 0.65 % nasal drops 2 SPRAYS EACH NOSTRIL AS NEEDED (WINTER)    zinc sulfate (Zincate) 220 (50 Zn) MG capsule Zinc CAPS   Refills: 0       Active       Allergies  Hizentra [immun glob g(igg)-pro-iga 0-50]; Immune globulin,gamma (igg) human; Octagam [immun globg(igg)-malt-iga ov50]; Pramipexole; Ropinirole; Sulfur; Tramadol; Amoxicillin; and Nabumetone    Review of Systems   Constitutional:  Negative for appetite change and fever.   HENT:  Negative for sore throat.         Denies dry mouth   Eyes:  Negative for visual disturbance.   Respiratory:  Negative for cough and shortness of breath.    Cardiovascular:  Negative for chest pain.   Gastrointestinal:  Negative for abdominal pain, constipation, diarrhea, nausea and vomiting.   Endocrine: Negative for polydipsia and polyuria.   Genitourinary:  Negative for frequency.   Musculoskeletal:  Positive for arthralgias.   Skin:  Negative for rash.   Neurological:  Negative for headaches.   Psychiatric/Behavioral:  The patient is not nervous/anxious.          Last Recorded Vitals  Blood pressure (!) 168/104, pulse 96, weight 78.9 kg (174 lb).    Physical Exam  Constitutional:       General: She is not in acute distress.  HENT:      Head: Normocephalic.   Eyes:      Extraocular Movements: Extraocular movements intact.   Neck:      Thyroid: No thyromegaly.   Cardiovascular:      Pulses:           Radial pulses are 2+ on the right side and 2+ on the left side.   Musculoskeletal:      Right lower leg: No edema.      Left lower leg: No edema.   Lymphadenopathy:      Cervical: No cervical adenopathy.   Neurological:      Mental Status: She is alert.   Psychiatric:         Mood and Affect: Affect normal.          Relevant Results   Latest Reference Range & Units 02/07/25 11:12   GLUCOSE 65 - 99 mg/dL 212 (H)   SODIUM 135 - 146 mmol/L 138   POTASSIUM 3.5 - 5.3 mmol/L 4.7    CHLORIDE 98 - 110 mmol/L 105   CARBON DIOXIDE 20 - 32 mmol/L 26   ELECTROLYTE BALANCE 7 - 17 mmol/L (calc) 7   UREA NITROGEN (BUN) 7 - 25 mg/dL 17   CREATININE 0.50 - 0.99 mg/dL 0.59   EGFR > OR = 60 mL/min/1.73m2 111   CALCIUM 8.6 - 10.2 mg/dL 8.9   ALBUMIN 3.6 - 5.1 g/dL 4.1   PROTEIN, TOTAL 6.1 - 8.1 g/dL 5.4 (L)   ALKALINE PHOSPHATASE 31 - 125 U/L 79   ALT 6 - 29 U/L 9   AST 10 - 35 U/L 8 (L)   BILIRUBIN, TOTAL 0.2 - 1.2 mg/dL 0.3   WHITE BLOOD CELL COUNT 3.8 - 10.8 Thousand/uL 6.3   RED BLOOD CELL COUNT 3.80 - 5.10 Million/uL 4.70   HEMOGLOBIN 11.7 - 15.5 g/dL 11.0 (L)   HEMATOCRIT 35.0 - 45.0 % 35.2   MCV 80.0 - 100.0 fL 74.9 (L)   MCH 27.0 - 33.0 pg 23.4 (L)   MCHC 32.0 - 36.0 g/dL 31.3 (L)   RDW 11.0 - 15.0 % 17.0 (H)   PLATELET COUNT 140 - 400 Thousand/uL 289   MPV 7.5 - 12.5 fL 10.0   ABSOLUTE NEUTROPHILS 1,500 - 7,800 cells/uL 4,530   ABSOLUTE LYMPHOCYTES 850 - 3,900 cells/uL 1,046   ABSOLUTE MONOCYTES 200 - 950 cells/uL 447   ABSOLUTE EOSINOPHILS 15 - 500 cells/uL 246   ABSOLUTE BASOPHILS 0 - 200 cells/uL 32   NEUTROPHILS % 71.9   LYMPHOCYTES % 16.6   MONOCYTES % 7.1   EOSINOPHILS % 3.9   BASOPHILS % 0.5         IMPRESSION  TYPE 2 DIABETES MELLITUS  Rapid A1c 7.9%  A1c above 7  Intolerance to metformin and SGLT2-inhibitors  Currently off GLP1-RA      RECOMMENDATIONS  Consider restarting Mounjaro    Follow up 6 months

## 2025-02-19 ENCOUNTER — APPOINTMENT (OUTPATIENT)
Facility: CLINIC | Age: 50
End: 2025-02-19
Payer: COMMERCIAL

## 2025-02-19 DIAGNOSIS — M19.90 INFLAMMATORY ARTHRITIS: Primary | ICD-10-CM

## 2025-02-19 DIAGNOSIS — Z79.899 HIGH RISK MEDICATION USE: ICD-10-CM

## 2025-02-19 PROCEDURE — 99215 OFFICE O/P EST HI 40 MIN: CPT | Performed by: INTERNAL MEDICINE

## 2025-02-19 RX ORDER — METHOTREXATE 2.5 MG/1
20 TABLET ORAL WEEKLY
Qty: 96 TABLET | Refills: 0 | Status: SHIPPED | OUTPATIENT
Start: 2025-02-19 | End: 2025-05-20

## 2025-02-19 RX ORDER — HYDROXYCHLOROQUINE SULFATE 200 MG/1
TABLET, FILM COATED ORAL
Qty: 180 TABLET | Refills: 0 | Status: SHIPPED | OUTPATIENT
Start: 2025-02-19

## 2025-02-19 NOTE — PROGRESS NOTES
Subjective   Patient ID: [id number]  Harriet Russo is a 49 y.o. female who presents for follow up     Virtual or Telephone Consent    An interactive audio and video telecommunication system which permits real time communications between the patient (at the originating site) and provider (at the distant site) was utilized to provide this telehealth service.   Verbal consent was requested and obtained from Harriet Russo on this date, 02/19/25 for a telehealth visit.      HPI  PMH/PSH: DM2, RLS, OA, CVID, SONJA, Reactive airway disease  Social: Nonsmoker, rarely drinks alcohol, no drug use. Works in [employer]   FHx: Sister with connective tissue disease, maternal aunt and uncle with RA, paternal grandmother with RA. Mom and brother have less severe form of immune deficiency.     Recall:  She's seen rheumatology on/off since 16 and when she moved back from college she started seeing Dr. Diop.      First visit 10/28:   She has chronic polyarthralgias.   Since memorial day weekend, she did a lot of yard work and her joints (mostly PIPs) have been hurting since and her wrists are swollen. Pain is worse with activity. Using scissors and wrenches to open jars/packages because she has been in so much pain.   She is doing OTC aleve three times a day it was helping up until May, voltaren gel on hands and icy hot on knees and feet and it takes the edge off.   She has had this pain chronically but her symptoms have worsened since memorial day.      R shoulder, L knee, L hip started bothering her pretty bad since 2022. Pain is worse with activity and improves with rest.      AM stiffness in hands 3-4 hours.     She was started on meloxicam 15mg daily 3 weeks ago and notes improved ROM and is using tylenol 1000mg once daily and menthol creams, reports ~30% improvement.   I performed an MRI that showed Bilateral flexor and extensor compartment tenosynovitis of the right hand along with radiocarpal and distal radioulnar  joint  effusions with associated synovitis and patchy marrow changes in the distal radius and ulna and suspicion for erosive disease.   At last visit we discussed TNFi with severity of imaging findings and presence of possible erosions to prevent further joint damage and improve outcomes. Patient would like to discuss with her allergist/immunologist and will update me prior to starting prior auth. She hasn't been able to get a hold of their office.     Current meds:   Methotrexate 15mg/week + FA since 01/2025    She feels 20% less stiff in her hands, still has significant pain in her hands with activity from MCPs down to the wrist.   Is doing tylenol in the evening and joint creams.     Since last visit she saw Dr. Rivas, and the ECU rupture this does not need a formal reconstruction.     No fever, chills, weight loss, night sweats or headaches. + dry eyes and sees ophtha. No blurry vision, redness or pain or photophobia. + dry mouth, dental loss. No loss of taste, nasal or oral ulcers, difficulty swallowing. No chest pain, palpitations, orthopnea. No shortness of breath, cough. No dysphagia, nausea, vomiting, + heartburn. No abdominal pain, constipation, + diarrhea. No melena or hematochezia  No genital or anal ulcers. No photosensitivity, rash or lesions, + Raynaud's phenomenon a few years ago but hasn't recurred.  + chronic numbness or tingling in hands and feet  Denies history of clots (arterial or venous). Never been pregnant.     Patient Active Problem List   Diagnosis    Abdominal pain, LUQ (left upper quadrant)    Abnormal blood chemistry    Abnormal mammogram of right breast    Arthralgia    Asthma    Bacterial pneumonia    Blurred vision, bilateral    Chronic sinusitis    Circadian rhythm sleep disorder, delayed sleep phase type    Combined variable immunodeficiency (Multi)    Cough    COVID-19 virus infection    Type 2 diabetes mellitus with complication, without long-term current use of insulin  (Multi)    Dry eyes    Esophageal reflux    Hypocalcemia    Hypokalemia    Idiopathic hypersomnia    Insomnia    NAFLD (nonalcoholic fatty liver disease)    Non-celiac gluten sensitivity    Obesity    Osteoarthritis, multiple sites    Other headache syndrome    Pneumonia    Premenstrual dysphoric disorder    Refractive error    Restless legs syndrome    ILD (interstitial lung disease) (Multi)    Rhinitis    Sleep disturbance    Vitamin D deficiency    Yeast infection    Abnormal liver enzymes    Chronic bronchitis (Multi)    Restrictive lung disease    Hypersomnia    Shift work sleep disorder    Inflammatory arthritis       Past Medical History:   Diagnosis Date    Dry eye syndrome of unspecified lacrimal gland 04/01/2013    Dry eye syndrome    Other conditions influencing health status 04/01/2013    Diabetes Mellitus Poorly Controlled    Unspecified disorder of refraction 04/01/2013    Refractive disorder    Unspecified inflammation of eyelid     Eyelid inflammation       Past Surgical History:   Procedure Laterality Date    MOUTH SURGERY  04/14/2016    Oral Surgery Tooth Extraction       Social History     Socioeconomic History    Marital status: Single     Spouse name: Not on file    Number of children: Not on file    Years of education: Not on file    Highest education level: Not on file   Occupational History    Not on file   Tobacco Use    Smoking status: Unknown    Smokeless tobacco: Not on file   Substance and Sexual Activity    Alcohol use: Not Currently    Drug use: Not on file    Sexual activity: Not on file   Other Topics Concern    Not on file   Social History Narrative    Not on file     Social Drivers of Health     Financial Resource Strain: Not on file   Food Insecurity: Not on file   Transportation Needs: Not on file   Physical Activity: Not on file   Stress: Not on file   Social Connections: Not on file   Intimate Partner Violence: Not on file   Housing Stability: Not on file       Allergies    Allergen Reactions    Hizentra [Immun Glob G(Igg)-Pro-Iga 0-50] Unknown    Immune Globulin,Gamma (Igg) Human Unknown    Octagam [Immun Globg(Igg)-Malt-Iga Ov50] Unknown    Pramipexole Unknown    Ropinirole Unknown    Sulfur Unknown    Tramadol Headache    Amoxicillin Rash    Nabumetone Rash         Current Outpatient Medications:     acetaminophen (Tylenol) 500 mg tablet, Take 2 tablets (1,000 mg) by mouth every 6 hours if needed for mild pain (1 - 3)., Disp: , Rfl:     albuterol (Ventolin HFA) 90 mcg/actuation inhaler, Ventolin  (90 Base) MCG/ACT Inhalation Aerosol Solution  Refills: 0     Active, Disp: , Rfl:     calcium carbonate (Tums) 200 mg calcium chewable tablet, Tums CHEW  Refills: 0     Active, Disp: , Rfl:     camphor-menthoL (Tiger Balm) ointment, [place] OINT  Refills: 0     Active, Disp: , Rfl:     cholecalciferol (Vitamin D-3) 50 MCG (2000 UT) tablet, 1 tablet (2,000 Units)., Disp: , Rfl:     colostrum, bovine 500 mg capsule, Once daily, Disp: , Rfl:     diclofenac sodium (Voltaren Arthritis Pain) 1 % gel gel, Voltaren GEL  Refills: 0     Active, Disp: , Rfl:     folic acid (Folvite) 1 mg tablet, Take 1 tablet (1 mg) by mouth once daily., Disp: 90 tablet, Rfl: 0    gabapentin (Neurontin) 600 mg tablet, take 1 tablet by mouth at bedtime and increase to 2 tablets nightly the week before period (#120=90 day supply per prescriber), Disp: 120 tablet, Rfl: 3    glipiZIDE (Glucotrol) 5 mg tablet, Take 2 tablets (10 mg) by mouth 2 times a day before meals., Disp: 360 tablet, Rfl: 3    ketotifen (Zaditor) 0.025 % (0.035 %) ophthalmic solution, Zaditor 0.025 % Ophthalmic Solution  Refills: 0     Active, Disp: , Rfl:     L. acidophilus/L.bulgaricus (LACTOBACILLUS ACIDOPH-L.BULGAR ORAL), Take 2 tablets by mouth 2 times a day with meals., Disp: , Rfl:     lancets 33 gauge misc, , Disp: , Rfl:     lidocaine (LMX) 4 % cream, Apply topically 4 times a day as needed for mild pain (1 - 3)., Disp: , Rfl:      loperamide HCl (IMODIUM ORAL), Imodium CAPS  Refills: 0     Active, Disp: , Rfl:     loratadine (Claritin) 10 mg tablet, Take 1 tablet (10 mg) by mouth once daily as needed., Disp: , Rfl:     mag/aluminum/sod bicarb/alginc (GAVISCON ORAL), Take by mouth., Disp: , Rfl:     meloxicam (Mobic) 15 mg tablet, Take 1 tablet (15 mg) by mouth once daily., Disp: 90 tablet, Rfl: 0    menthol 4 % cream, Menthol (Topical Analgesic) CREA  Refills: 0     Active, Disp: , Rfl:     methotrexate (Trexall) 2.5 mg tablet, Take 6 tablets (15 mg total) by mouth 1 (one) time per week.  Follow directions carefully, and ask to explain any part you do not understand. Take exactly as directed., Disp: 72 tablet, Rfl: 0    mometasone (Nasonex) 50 mcg/actuation nasal spray, USE AS DIRECTED AS NEEDED - SPRING ALLERGIES, Disp: , Rfl:     mv,calcium,min/iron/folic/vitK (MULTI FOR HER ORAL), TAKE AS DIRECTED., Disp: , Rfl:     OneTouch Verio test strips strip, TEST THREE TIMES DAILY, Disp: , Rfl:     propylene glycol-glycerin (Soothe) 0.6-0.6 % dropperette ophthalmic solution, Soothe 0.6-0.6 % Ophthalmic Solution  Refills: 0     Active, Disp: , Rfl:     sertraline (Zoloft) 50 mg tablet, Take 1 tablet (50 mg) by mouth once daily., Disp: 30 tablet, Rfl: 11    sertraline (Zoloft) 50 mg tablet, Take 1 tablet (50 mg) by mouth once daily., Disp: 30 tablet, Rfl: 11    sertraline (Zoloft) 50 mg tablet, Take 1 tablet (50 mg) by mouth once daily., Disp: 30 tablet, Rfl: 11    sodium chloride (Ayr) 0.65 % nasal drops, 2 SPRAYS EACH NOSTRIL AS NEEDED (WINTER), Disp: , Rfl:     zinc sulfate (Zincate) 220 (50 Zn) MG capsule, Zinc CAPS  Refills: 0     Active, Disp: , Rfl:     Objective     There were no vitals taken for this visit.    Physical Exam  Virtual visit, pleasant      Lab Results   Component Value Date    WBC 6.3 02/07/2025    HGB 11.0 (L) 02/07/2025    HCT 35.2 02/07/2025    MCV 74.9 (L) 02/07/2025     02/07/2025       Chemistry    Lab Results    Component Value Date/Time     02/07/2025 1112    K 4.7 02/07/2025 1112     02/07/2025 1112    CO2 26 02/07/2025 1112    BUN 17 02/07/2025 1112    CREATININE 0.59 02/07/2025 1112    Lab Results   Component Value Date/Time    CALCIUM 8.9 02/07/2025 1112    ALKPHOS 79 02/07/2025 1112    AST 8 (L) 02/07/2025 1112    ALT 9 02/07/2025 1112    BILITOT 0.3 02/07/2025 1112          Lab Results   Component Value Date    CRP 2.84 (H) 01/06/2025    SERA Negative 10/31/2024    JO1 <0.2 10/31/2024    CALCIUM 8.9 02/07/2025    PHOS 3.7 06/10/2021    FERRITIN 105 10/31/2024     ALKALINE PHOSPHATASE   Date Value Ref Range Status   02/07/2025 79 31 - 125 U/L Final     AST   Date Value Ref Range Status   02/07/2025 8 (L) 10 - 35 U/L Final     UREA NITROGEN (BUN)   Date Value Ref Range Status   02/07/2025 17 7 - 25 mg/dL Final     SODIUM   Date Value Ref Range Status   02/07/2025 138 135 - 146 mmol/L Final     POTASSIUM   Date Value Ref Range Status   02/07/2025 4.7 3.5 - 5.3 mmol/L Final     CARBON DIOXIDE   Date Value Ref Range Status   02/07/2025 26 20 - 32 mmol/L Final     ALT   Date Value Ref Range Status   02/07/2025 9 6 - 29 U/L Final       XR sacroiliac joints 3+ views  Narrative: Interpreted By:  Donald Israel,   STUDY:  XR SACROILIAC JOINTS 3+ VIEWS; ;  1/6/2025 11:53 am      INDICATION:  Signs/Symptoms:evaluate for sacroilitis, has peripheral inflammatory  arthritis and positive GLORIA but also has CAM deformities.      ,M19.90 Unspecified osteoarthritis, unspecified site      COMPARISON:  None.      ACCESSION NUMBER(S):  [id number]      ORDERING CLINICIAN:  AUSTIN ARANA      FINDINGS:  No acute fracture or dislocation. Transitional anatomy with  pseudoarthrosis of right lumbosacral junction. No widening or luis  erosions of the sacroiliac joints. Small early marginal osteophytes.      Impression: Mild early sacroiliac joint osteoarthritis.          MACRO:  None      Signed by: Donald Israel 1/8/2025  2:36 PM  Dictation workstation:   [id number]    MSK US   1. Bilateral multicompartment flexor and extensor compartment  tenosynovitis, predominantly seen as synovial proliferation in the  tendons sheaths.  2. Bilateral radiocarpal and distal radioulnar joint effusions with  synovitis.  3. Constellation of the above impressions is compatible with  nonspecific inflammatory arthropathy. Laboratory correlation is  recommended.  4. Tendinosis of the bilateral extensor carpi ulnaris tendon with  findings suspicious for at least a degree of partial tearing the  right extensor carpi ulnaris tendon at the level the wrist. A greater  degree of tearing is not excluded on the basis of this exam.  5. Findings suspicious for a degree tearing the extensor digitorum  longus tendon slip to the 4th digit of the right hand with associated  tendinosis. 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 Given all of the above impressions, rheumatoid screening protocol  MRI of the bilateral hand without and with contrast may be helpful,  including to further assess for tendon compromise.  XR R hip   FINDINGS:  There is no radiographic evidence of acute fracture or dislocation.    There is positive crossover sign bilaterally, indicating retroverted   acetabula and a pincer-type deformity. There is note made of an IUD   seen in the pelvis.    XR R shoulder  FINDINGS:  Mild right acromioclavicular osteoarthritis. No evidence of fracture.  No erosions.      IMPRESSION:  Mild right acromioclavicular osteoarthritis.     IMPRESSION:  Bilateral acetabular retroversion compatible with pincer type   deformity of the hip joints bilaterally.      LUNGS and AIRWAYS:  The trachea and central airways are patent. No endobronchial lesion.   Stable biapical pleural scarring.  Within the lower lung bases   bilaterally there are multiple ill-defined nodular opacities   predominantly pleural-based likely of inflammatory or infectious   etiology.  These  findings are stable compared to prior CT of the   chest without contrast 11/6/2014. There is no evidence of pleural   effusion or pneumothorax.  There is no evidence of air trapping on   expiratory imaging.     MEDIASTINUM and DAVID, LOWER NECK AND AXILLA:  The visualized thyroid gland is within normal limits.  No evidence of thoracic lymphadenopathy by CT criteria.  Small hiatal hernia     HEART and VESSELS:  The thoracic aorta is of normal course and caliber without vascular   calcifications.   Main pulmonary artery and its branches are normal in caliber.  No coronary artery calcifications are seen. The study is not   optimized for evaluation of coronary arteries.   The cardiac chambers are not enlarged.   No evidence of pericardial effusion.     UPPER ABDOMEN:  The spleen is enlarged measuring 14 cm.     CHEST WALL and OSSEOUS STRUCTURES:  There are no suspicious osseous lesions..  ______________     IMPRESSION:  1. Ill-defined nodular opacities at the lung bases bilaterally that   are predominantly pleural-based with nonspecific groundglass   opacities that appear similar to those seen on CT the chest from 2008   likely due to chronic inflammatory/infectious process secondary to   patient's known CVID.    2. Splenomegaly    Assessment/Plan   A 49 year old F with CVID, uncontrolled DM here for follow up of inflammatory arthritis.     SERA, MACHELLE neg, RF, CCP neg  CRP 3mg/dL, ESR 37, elevated alkaline phosphatase  Microcytic anemia, low iron, TIBC, normal ferritin    She also reports sicca symptoms with dry mucous membranes. No glandular swelling.     MRI R hand with high grade tear of ECU, extensor digitorum tendon slip to the 4th finger, b/l flexor and extensor tenosynovitis, radioulnar and radiocarpal joint effusions with synovial thickening, some  surface irregularity/erosion to the proximal ulnar side of the lunate  bone.     Hip XR with b/l CAM deformities from 2016. XR SI reviewed with marginal osteophytes.        Reports some improvement with methotrexate but less than 50%.  Monitoring labs from 02/2025 reviewed    - Discussed risks/benefits in depth of TNFi vs csDMARDs. We also discussed that I would recommend initiating a TNFi with severity of imaging findings and presence of possible erosions to prevent further joint damage and improve outcomes. Patient would like to discuss with her allergist/immunologist and will update me prior to starting prior auth. She hasn't been able to get a hold of Dr. Raman's office.   - Increase methotrexate to 20mg/week and start HCQ, still ideally would like to start TNFi but patient would like to hold off until she sees allergy. Counseled on contraception with methotrexate, she is not sexually active.   - Labs in one month and one week prior to follow up.     RTC in 3 months      Pedrito Masterson MD  Division of Rheumatology   [facility]

## 2025-03-20 LAB
ALBUMIN SERPL-MCNC: 4.2 G/DL (ref 3.6–5.1)
ALP SERPL-CCNC: 81 U/L (ref 31–125)
ALT SERPL-CCNC: 9 U/L (ref 6–29)
ANION GAP SERPL CALCULATED.4IONS-SCNC: 10 MMOL/L (CALC) (ref 7–17)
AST SERPL-CCNC: 10 U/L (ref 10–35)
BASOPHILS # BLD AUTO: 22 CELLS/UL (ref 0–200)
BASOPHILS NFR BLD AUTO: 0.4 %
BILIRUB SERPL-MCNC: 0.4 MG/DL (ref 0.2–1.2)
BUN SERPL-MCNC: 13 MG/DL (ref 7–25)
CALCIUM SERPL-MCNC: 9.4 MG/DL (ref 8.6–10.2)
CHLORIDE SERPL-SCNC: 101 MMOL/L (ref 98–110)
CO2 SERPL-SCNC: 27 MMOL/L (ref 20–32)
CREAT SERPL-MCNC: 0.73 MG/DL (ref 0.5–0.99)
CRP SERPL-MCNC: 49.2 MG/L
EGFRCR SERPLBLD CKD-EPI 2021: 101 ML/MIN/1.73M2
EOSINOPHIL # BLD AUTO: 99 CELLS/UL (ref 15–500)
EOSINOPHIL NFR BLD AUTO: 1.8 %
ERYTHROCYTE [DISTWIDTH] IN BLOOD BY AUTOMATED COUNT: 18.1 % (ref 11–15)
ERYTHROCYTE [SEDIMENTATION RATE] IN BLOOD BY WESTERGREN METHOD: 33 MM/H
GLUCOSE SERPL-MCNC: 213 MG/DL (ref 65–99)
HCT VFR BLD AUTO: 33.8 % (ref 35–45)
HGB BLD-MCNC: 10.4 G/DL (ref 11.7–15.5)
LYMPHOCYTES # BLD AUTO: 622 CELLS/UL (ref 850–3900)
LYMPHOCYTES NFR BLD AUTO: 11.3 %
MCH RBC QN AUTO: 23.4 PG (ref 27–33)
MCHC RBC AUTO-ENTMCNC: 30.8 G/DL (ref 32–36)
MCV RBC AUTO: 76 FL (ref 80–100)
MONOCYTES # BLD AUTO: 369 CELLS/UL (ref 200–950)
MONOCYTES NFR BLD AUTO: 6.7 %
NEUTROPHILS # BLD AUTO: 4389 CELLS/UL (ref 1500–7800)
NEUTROPHILS NFR BLD AUTO: 79.8 %
PLATELET # BLD AUTO: 296 THOUSAND/UL (ref 140–400)
PMV BLD REES-ECKER: 9.8 FL (ref 7.5–12.5)
POTASSIUM SERPL-SCNC: 4.8 MMOL/L (ref 3.5–5.3)
PROT SERPL-MCNC: 5.9 G/DL (ref 6.1–8.1)
RBC # BLD AUTO: 4.45 MILLION/UL (ref 3.8–5.1)
SODIUM SERPL-SCNC: 138 MMOL/L (ref 135–146)
WBC # BLD AUTO: 5.5 THOUSAND/UL (ref 3.8–10.8)

## 2025-03-23 ASSESSMENT — ENCOUNTER SYMPTOMS
SORE THROAT: 0
FREQUENCY: 0
FEVER: 0
HEADACHES: 0
DIARRHEA: 0
ARTHRALGIAS: 1
APPETITE CHANGE: 0
NAUSEA: 0
NERVOUS/ANXIOUS: 0
POLYDIPSIA: 0
CONSTIPATION: 0
ABDOMINAL PAIN: 0
SHORTNESS OF BREATH: 0
VOMITING: 0
COUGH: 0

## 2025-03-24 RX ORDER — MELOXICAM 15 MG/1
15 TABLET ORAL DAILY
Qty: 30 TABLET | Refills: 0 | OUTPATIENT
Start: 2025-03-24 | End: 2025-04-23

## 2025-03-24 RX ORDER — METHOTREXATE 2.5 MG/1
15 TABLET ORAL WEEKLY
Qty: 24 TABLET | Refills: 0 | OUTPATIENT
Start: 2025-03-24 | End: 2025-04-23

## 2025-04-01 ENCOUNTER — OFFICE VISIT (OUTPATIENT)
Dept: URGENT CARE | Age: 50
End: 2025-04-01
Payer: COMMERCIAL

## 2025-04-01 VITALS
HEART RATE: 88 BPM | TEMPERATURE: 97.8 F | BODY MASS INDEX: 27.97 KG/M2 | SYSTOLIC BLOOD PRESSURE: 155 MMHG | HEIGHT: 66 IN | DIASTOLIC BLOOD PRESSURE: 90 MMHG | WEIGHT: 174 LBS | OXYGEN SATURATION: 99 % | RESPIRATION RATE: 20 BRPM

## 2025-04-01 DIAGNOSIS — J01.00 ACUTE MAXILLARY SINUSITIS, RECURRENCE NOT SPECIFIED: Primary | ICD-10-CM

## 2025-04-01 PROCEDURE — 3080F DIAST BP >= 90 MM HG: CPT | Performed by: PHYSICIAN ASSISTANT

## 2025-04-01 PROCEDURE — 3008F BODY MASS INDEX DOCD: CPT | Performed by: PHYSICIAN ASSISTANT

## 2025-04-01 PROCEDURE — 3077F SYST BP >= 140 MM HG: CPT | Performed by: PHYSICIAN ASSISTANT

## 2025-04-01 PROCEDURE — 99203 OFFICE O/P NEW LOW 30 MIN: CPT | Performed by: PHYSICIAN ASSISTANT

## 2025-04-01 RX ORDER — AZITHROMYCIN 250 MG/1
TABLET, FILM COATED ORAL
Qty: 6 TABLET | Refills: 0 | Status: SHIPPED | OUTPATIENT
Start: 2025-04-01

## 2025-04-01 RX ORDER — BROMPHENIRAMINE MALEATE, PSEUDOEPHEDRINE HYDROCHLORIDE, AND DEXTROMETHORPHAN HYDROBROMIDE 2; 30; 10 MG/5ML; MG/5ML; MG/5ML
10 SYRUP ORAL 4 TIMES DAILY PRN
Qty: 250 ML | Refills: 0 | Status: SHIPPED | OUTPATIENT
Start: 2025-04-01 | End: 2025-04-11

## 2025-04-01 NOTE — LETTER
April 1, 2025     Patient: Harriet Russo   YOB: 1975   Date of Visit: 4/1/2025       To Whom It May Concern:    Harriet Russo was seen in my clinic on 4/1/2025 at 3:00 pm. Please excuse Harriet for her absence from work on this day to make the appointment.    If you have any questions or concerns, please don't hesitate to call.         Sincerely,         Philip Schulte PA-C        CC: No Recipients

## 2025-04-01 NOTE — PROGRESS NOTES
Subjective   Patient ID: Harriet Russo is a 49 y.o. female. They present today with a chief complaint of Illness (10 days. Headache and sinus pressure. Cough./).    History of Present Illness  Patient is a very pleasant 49-year-old white female, past medical history of diabetes, presenting to the clinic for chief complaint of upper respiratory congestion.  Patient reporting approximately 10-day history of persistent upper respiratory congestion postnasal drainage and dry cough.  She is reporting facial pain and pressure below her eyes bilaterally.  She does report purulent nasal discharge as well.  No chest pain or shortness of breath.  No abdominal pain, nausea, vomiting.  No numbness tingling or focal weakness.      Illness      Past Medical History  Allergies as of 04/01/2025 - Reviewed 04/01/2025   Allergen Reaction Noted    Hizentra [immun glob g(igg)-pro-iga 0-50] Unknown 11/06/2023    Immune globulin,gamma (igg) human Unknown 11/06/2023    Octagam [immun globg(igg)-malt-iga ov50] Unknown 11/06/2023    Pramipexole Unknown 11/06/2023    Ropinirole Unknown 11/06/2023    Sulfur Unknown 11/06/2023    Tramadol Headache 11/06/2023    Amoxicillin Rash 05/17/2015    Nabumetone Rash 11/06/2023       (Not in a hospital admission)         Past Medical History:   Diagnosis Date    Dry eye syndrome of unspecified lacrimal gland 04/01/2013    Dry eye syndrome    Other conditions influencing health status 04/01/2013    Diabetes Mellitus Poorly Controlled    Unspecified disorder of refraction 04/01/2013    Refractive disorder    Unspecified inflammation of eyelid     Eyelid inflammation       Past Surgical History:   Procedure Laterality Date    MOUTH SURGERY  04/14/2016    Oral Surgery Tooth Extraction        reports that she does not currently use alcohol.    Review of Systems  Review of Systems                               Objective    Vitals:    04/01/25 1505   BP: 155/90   Pulse: 88   Resp: 20   Temp: 36.6 °C (97.8 °F)  "  SpO2: 99%   Weight: 78.9 kg (174 lb)   Height: 1.676 m (5' 6\")     No LMP recorded.    Physical Exam  General: Vitals Noted. No distress. Normocephalic.     HEENT: TMs normal, EOMI, normal conjunctiva, patent nares with erythematous edematous and appear nasal turbinates and clear rhinorrhea bilaterally.  Posterior oropharynx with signs of postnasal drainage without any erythema swelling or tonsillar exudate.  Uvula is in the midline and nonedematous.  No drooling.  No trismus.  Positive tenderness to palpation over bilateral maxillary sinuses with no overlying skin changes.    Neck: Supple with no adenopathy.     Cardiac: Regular Rate and Rhythm. No murmur.     Pulmonary: Equal breath sounds bilaterally. No wheezes, rhonchi, or rales.    Abdomen: Soft, non-tender, with normal bowel sounds.     Musculoskeletal: Moves all extremities, no effusion, no edema.     Skin: No obvious rashes.  Procedures    Point of Care Test & Imaging Results from this visit    Imaging  No results found.    Cardiology, Vascular, and Other Imaging  No other imaging results found for the past 2 days      Diagnostic study results (if any) were reviewed by Philip Schulte PA-C.    Assessment/Plan   Allergies, medications, history, and pertinent labs/EKGs/Imaging reviewed by Philip Schulte PA-C.     Medical Decision Making  Patient was seen eval in the clinic for to complaint of upper respiratory congestion with increasing sinus pain and pressure and increasingly purulent nasal discharge.  On exam patient is nontoxic very well-appearing respect comfortably no acute distress.  Vital signs are stable, afebrile.  Chest is clear, heart is regular, belly is diffusely soft and nontender.  ENT examination as above concerning for developing acute sinusitis given duration of symptoms and increasingly purulent nasal discharge.  Will treat with Z-Silverio.  Also provided prescription of Bromfed-DM to use as needed for her cough and congestion.  " Advised to follow-up with her primary care physician in the next week.  I reviewed my impression, plan, strict return was report to ED precautions with the patient.  She expresses understanding and agreement plan of care.      Orders and Diagnoses  Diagnoses and all orders for this visit:  Acute maxillary sinusitis, recurrence not specified  -     azithromycin (Zithromax) 250 mg tablet; Take 2 tablets for one day then 1 tablet per day for 4 days  -     brompheniramine-pseudoeph-DM 2-30-10 mg/5 mL syrup; Take 10 mL by mouth 4 times a day as needed for congestion or cough for up to 10 days.        Medical Admin Record      Follow Up Instructions  No follow-ups on file.    Patient disposition: Home    Electronically signed by Philip Schulte PA-C  3:16 PM

## 2025-04-14 ENCOUNTER — APPOINTMENT (OUTPATIENT)
Dept: RHEUMATOLOGY | Facility: CLINIC | Age: 50
End: 2025-04-14
Payer: COMMERCIAL

## 2025-04-25 DIAGNOSIS — M19.90 INFLAMMATORY ARTHRITIS: ICD-10-CM

## 2025-04-25 RX ORDER — FOLIC ACID 1 MG/1
1 TABLET ORAL DAILY
Qty: 180 TABLET | Refills: 0 | Status: SHIPPED | OUTPATIENT
Start: 2025-04-25 | End: 2025-10-22

## 2025-05-06 DIAGNOSIS — M19.90 INFLAMMATORY ARTHRITIS: ICD-10-CM

## 2025-05-07 RX ORDER — MELOXICAM 15 MG/1
15 TABLET ORAL DAILY
Qty: 30 TABLET | Refills: 0 | Status: SHIPPED | OUTPATIENT
Start: 2025-05-07 | End: 2025-06-06

## 2025-05-08 DIAGNOSIS — J47.9 BRONCHIECTASIS WITHOUT COMPLICATION (MULTI): ICD-10-CM

## 2025-05-08 DIAGNOSIS — L50.8 CHRONIC URTICARIA: Primary | ICD-10-CM

## 2025-05-08 DIAGNOSIS — B99.9 RECURRENT INFECTIONS: ICD-10-CM

## 2025-05-09 ENCOUNTER — SPECIALTY PHARMACY (OUTPATIENT)
Dept: PHARMACY | Facility: CLINIC | Age: 50
End: 2025-05-09

## 2025-05-09 DIAGNOSIS — Z79.899 HIGH RISK MEDICATION USE: ICD-10-CM

## 2025-05-09 DIAGNOSIS — M06.00 SERONEGATIVE RHEUMATOID ARTHRITIS (MULTI): Primary | ICD-10-CM

## 2025-05-09 DIAGNOSIS — M19.90 INFLAMMATORY ARTHRITIS: ICD-10-CM

## 2025-05-13 ENCOUNTER — PHARMACY VISIT (OUTPATIENT)
Dept: PHARMACY | Facility: CLINIC | Age: 50
End: 2025-05-13
Payer: COMMERCIAL

## 2025-05-13 ENCOUNTER — SPECIALTY PHARMACY (OUTPATIENT)
Dept: PHARMACY | Facility: CLINIC | Age: 50
End: 2025-05-13

## 2025-05-13 DIAGNOSIS — M25.50 POLYARTHRALGIA: Primary | ICD-10-CM

## 2025-05-13 DIAGNOSIS — M19.90 INFLAMMATORY ARTHRITIS: ICD-10-CM

## 2025-05-13 PROCEDURE — RXMED WILLOW AMBULATORY MEDICATION CHARGE

## 2025-05-13 RX ORDER — ADALIMUMAB-ADAZ 40 MG/.4ML
40 INJECTION, SOLUTION SUBCUTANEOUS
Qty: 0.8 ML | Refills: 3 | Status: SHIPPED | OUTPATIENT
Start: 2025-05-13 | End: 2025-05-15 | Stop reason: SDUPTHER

## 2025-05-13 NOTE — PROGRESS NOTES
New start adalimumab-adaz 40mg/0.4mL under the skin once every other week for seronegative RA. Updated rx sent to [facility] for dispensing. PA approved through Nov 2025.

## 2025-05-15 ENCOUNTER — TELEMEDICINE CLINICAL SUPPORT (OUTPATIENT)
Dept: PHARMACY | Facility: HOSPITAL | Age: 50
End: 2025-05-15
Payer: COMMERCIAL

## 2025-05-15 DIAGNOSIS — M19.90 INFLAMMATORY ARTHRITIS: ICD-10-CM

## 2025-05-15 DIAGNOSIS — M25.50 POLYARTHRALGIA: ICD-10-CM

## 2025-05-15 RX ORDER — ADALIMUMAB-ADAZ 40 MG/.4ML
40 INJECTION, SOLUTION SUBCUTANEOUS
Qty: 0.8 ML | Refills: 3 | Status: SHIPPED | OUTPATIENT
Start: 2025-05-15

## 2025-05-15 NOTE — PROGRESS NOTES
[facility] Clinical Note  Initial Patient Education     Introduction  Harriet Russo is a 49 y.o. female who is on the specialty pharmacy service for management of seronegative RA/inflammatory arthritis.   Rheumatology Core Program    Harriet Russo is initiating the following therapy: Hyrimoz (adalimumab-adaz) 40mg/0.4mL pens under the skin every other week for seronegative RA/inflammatory arthritis.       Medication receipt date: 5/15/2025    Duration of therapy: Maintenance    The most recent encounter visit with the referring prescriber Dr. Pedrito Masterson on 2/19/2025 was reviewed.  Pharmacy will continue to collaborate in the care of this patient with the referring prescriber.    Clinical Background  An initial assessment was conducted prior to first fill of the medication to determine the appropriateness of therapy given the patient's diagnosis, medication list, comorbidities, allergies, medical history, patient's ability to self administer medication, and therapeutic goals based on possible outcomes of therapy. Refer to initial assessment task completed on 5/13/2025.    Labs for clinical appropriateness that were reviewed include:   Rheumatology- CBC-diff:   Lab Results   Component Value Date    WBC 5.5 03/19/2025    RBC 4.45 03/19/2025    HGB 10.4 (L) 03/19/2025    HCT 33.8 (L) 03/19/2025    MCV 76.0 (L) 03/19/2025    MCHC 30.8 (L) 03/19/2025     03/19/2025    RDW 18.1 (H) 03/19/2025    NEUTOPHILPCT 77.9 01/06/2025    IGPCT 1.0 (H) 01/06/2025    LYMPHOPCT 11.3 03/19/2025    MONOPCT 6.7 03/19/2025    EOSPCT 1.8 03/19/2025    BASOPCT 0.4 03/19/2025    NEUTROABS 5.70 01/06/2025    LYMPHSABS 0.85 (L) 01/06/2025    MONOSABS 0.50 01/06/2025    EOSABS 99 03/19/2025    BASOSABS 22 03/19/2025   , CMP:   Lab Results   Component Value Date    GLUCOSE 213 (H) 03/19/2025     03/19/2025    K 4.8 03/19/2025     03/19/2025    CO2 27 03/19/2025    ANIONGAP 10 03/19/2025    BUN 13  03/19/2025    CREATININE 0.73 03/19/2025    GFRF >90 08/04/2023    CALCIUM 9.4 03/19/2025    ALBUMIN 4.2 03/19/2025    ALKPHOS 81 03/19/2025    PROT 5.9 (L) 03/19/2025    AST 10 03/19/2025    BILITOT 0.4 03/19/2025    ALT 9 03/19/2025   , and TB:   Lab Results   Component Value Date    TBSIN Negative 01/06/2025       Education/Discussion  Harriet was contacted on 5/15/2025 at 1:20 PM for a pharmacy visit with encounter number [id number] from:   [facility]  [address]    [facility] [address]  Dept: [phone]  Dept Fax: [phone]  Loc: [phone]  Harriet consented to a/an Telephone visit, which was performed.    Medication Start Date (planned or actual): Saturday - 5/17/25      Education was conducted prior to start of therapy? Yes    Education discussed includes the following:  Patient Education  Counseled the Patient on the Following : Theraputic rationale and expected outcomes, Expected duration of therapy, Doses and administration, Adherence and missed doses, Possible side effects and management, Possible drug interactions, Lab monitoring and follow-up, Safe handling, storage, and disposal, Contraindications and precautions, Use of contraception, Associated vaccinations, Cost of medications, Pharmacy contact information  Learner: Patient  Education Method: Explanation  Education Response: Verbalizes understanding  Additional details of the medication specific counseling are found within the linked patient education flowsheet.     The follow up timeline was discussed. Every person responds to and reacts to therapy differently. Patient should be assessed for efficacy and tolerability in approximately: 6 months    Provided education on goals and possible outcomes of therapy:  Adherence with therapy  Timely completion of appropriate labs  Timely and appropriate follow up with provider  Identify and address medication interactions with presciption  medications, OTC medications and supplements  Optimize or maintain quality of life  Rheumatology: Remission or low disease activity  Reduction of inflammation, joint pain, swelling, and morning stiffness      The importance of adherence was discussed and they were advised to take the medication as prescribed by their provider.     Spoke with patient regarding starting Hyrimoz injections - patient is comfortable with self-injecting, has been on other medications in the past. A little worried about injection-site reactions but is aware of what to look for and will let us know if anything happens. Pt appreciated the call and knows to reach out if there are any questions/concerns.       Impression/Plan  Review and Assessment   Reviewed During This Encounter: Allergies, Immunizations, Medications, Problem list  Medications Assessed for Appropriate Use, Dose, Route, Frequency, and Duration: Yes  Medication Reconciliation Completed: Yes  Drug Interactions Evaluated: Yes  Clinically Relevant Drug Interactions Identified: No    This patient has not been identified as high risk due to Lack of high risk qualifiers.  The following action was taken: N/A.    QOL/Patient Satisfaction  Rate your quality of life on scale of 1-10: 6  Rate your satisfaction with  Specialty Pharmacy on scale of 1-10: 10 - Completely satisfied    The  Specialty Pharmacy Welcome packet may be viewed here:   Specialty Pharmacy Welcome Packet     Or by scanning QR code:      Provided contact information ([phone]) for [facility] and reviewed dispensing process, refill timeline and patient management follow up. Advised to contact the pharmacy if there are any adverse effects and/or changes to medication list, including prescriptions, OTC medications, herbal products, or supplements. Confirmed understanding of education conducted during assessment. All questions and concerns were addressed and patient was encouraged to  reach out for additional questions or concerns.      Aisha May, Rose MarieD

## 2025-06-02 ENCOUNTER — APPOINTMENT (OUTPATIENT)
Dept: ENDOCRINOLOGY | Facility: CLINIC | Age: 50
End: 2025-06-02
Payer: COMMERCIAL

## 2025-06-02 ENCOUNTER — APPOINTMENT (OUTPATIENT)
Dept: RHEUMATOLOGY | Facility: CLINIC | Age: 50
End: 2025-06-02
Payer: COMMERCIAL

## 2025-06-02 VITALS
SYSTOLIC BLOOD PRESSURE: 159 MMHG | WEIGHT: 166 LBS | DIASTOLIC BLOOD PRESSURE: 98 MMHG | HEART RATE: 102 BPM | BODY MASS INDEX: 26.79 KG/M2

## 2025-06-02 DIAGNOSIS — E11.8 TYPE 2 DIABETES MELLITUS WITH COMPLICATION, WITHOUT LONG-TERM CURRENT USE OF INSULIN (MULTI): ICD-10-CM

## 2025-06-02 LAB — POC HEMOGLOBIN A1C: 7.5 % (ref 4.2–6.5)

## 2025-06-02 PROCEDURE — 99214 OFFICE O/P EST MOD 30 MIN: CPT | Performed by: INTERNAL MEDICINE

## 2025-06-02 PROCEDURE — 3077F SYST BP >= 140 MM HG: CPT | Performed by: INTERNAL MEDICINE

## 2025-06-02 PROCEDURE — 83036 HEMOGLOBIN GLYCOSYLATED A1C: CPT | Performed by: INTERNAL MEDICINE

## 2025-06-02 PROCEDURE — 3080F DIAST BP >= 90 MM HG: CPT | Performed by: INTERNAL MEDICINE

## 2025-06-02 PROCEDURE — 3051F HG A1C>EQUAL 7.0%<8.0%: CPT | Performed by: INTERNAL MEDICINE

## 2025-06-02 ASSESSMENT — ENCOUNTER SYMPTOMS
CONSTIPATION: 0
NUMBNESS: 1
NAUSEA: 0
SORE THROAT: 0
FEVER: 0
ABDOMINAL PAIN: 0
HEADACHES: 0
POLYDIPSIA: 0
FREQUENCY: 0
ARTHRALGIAS: 1
DIARRHEA: 0
VOMITING: 0
SHORTNESS OF BREATH: 0
NERVOUS/ANXIOUS: 0
APPETITE CHANGE: 0
COUGH: 0

## 2025-06-02 NOTE — PROGRESS NOTES
History Of Present Illness  Harriet Russo is a 49 y.o. female     Duration of type 2 diabetes mellitus:  14 years  Complications:  none     Recent reaction to adalimumab-adaz (Hyrimoz), bioequivalent to Humira.     Glipizide 2.5 mg TID.  She took 10 mg BID after adalimumab-adaz reaction     Prior use of Mounjaro, stopped due to cost.  She is aware cost for Ozempic would be the same.  Intolerant to metformin.  History of polyuria and vaginal candidiasis on Invokana  Prior use of Victoza, without recall of side effect.     Patient is testing glucose 1 time daily  Records reviewed, on file     Last eye exam:  November 2023    Past Medical History  She has a past medical history of Dry eye syndrome of unspecified lacrimal gland (04/01/2013), Other conditions influencing health status (04/01/2013), Unspecified disorder of refraction (04/01/2013), and Unspecified inflammation of eyelid.    Surgical History  She has a past surgical history that includes Mouth surgery (04/14/2016).     Social History  She reports that she does not currently use alcohol. No history on file for tobacco use and drug use.    Family History  Family History[1]    Medications  Current Outpatient Medications   Medication Instructions    acetaminophen (TYLENOL) 1,000 mg, Every 6 hours PRN    adalimumab-adaz 40 mg/0.4 mL pen injector Inject 1 pen (40 mg) under the skin every 14 (fourteen) days.    albuterol (Ventolin HFA) 90 mcg/actuation inhaler Ventolin  (90 Base) MCG/ACT Inhalation Aerosol Solution   Refills: 0       Active    calcium carbonate (Tums) 200 mg calcium chewable tablet Tums CHEW   Refills: 0       Active    camphor-menthoL (Tiger Balm) ointment [place] OINT   Refills: 0       Active    cholecalciferol (Vitamin D-3) 50 MCG (2000 UT) tablet 1 tablet (2,000 Units).    colostrum, bovine 500 mg capsule Once daily    diclofenac sodium (Voltaren Arthritis Pain) 1 % gel gel Voltaren GEL   Refills: 0       Active    folic acid  (FOLVITE) 1 mg, oral, Daily    gabapentin (Neurontin) 600 mg tablet take 1 tablet by mouth at bedtime and increase to 2 tablets nightly the week before period (#120=90 day supply per prescriber)    glipiZIDE (GLUCOTROL) 10 mg, oral, 2 times daily before meals    hydroxychloroquine (Plaquenil) 200 mg tablet Take 400mg Mondays, Tuesday, Wednesday, Thursday, Friday, Saturday and 1 pill (200mg) on Sundays    ketotifen (Zaditor) 0.025 % (0.035 %) ophthalmic solution Zaditor 0.025 % Ophthalmic Solution   Refills: 0       Active    L. acidophilus/L.bulgaricus (LACTOBACILLUS ACIDOPH-L.BULGAR ORAL) 2 tablets, 2 times daily (morning and late afternoon)    lancets 33 gauge misc No dose, route, or frequency recorded.    lidocaine (LMX) 4 % cream 4 times daily PRN    loperamide HCl (IMODIUM ORAL) Imodium CAPS   Refills: 0       Active    loratadine (Claritin) 10 mg tablet 1 tablet, Daily PRN    mag/aluminum/sod bicarb/alginc (GAVISCON ORAL) Take by mouth.    meloxicam (MOBIC) 15 mg, oral, Daily    menthol 4 % cream Menthol (Topical Analgesic) CREA   Refills: 0       Active    methotrexate (TREXALL) 20 mg, oral, Weekly, Follow directions carefully, and ask to explain any part you do not understand. Take exactly as directed.    mometasone (Nasonex) 50 mcg/actuation nasal spray USE AS DIRECTED AS NEEDED - SPRING ALLERGIES    mv,calcium,min/iron/folic/vitK (MULTI FOR HER ORAL) TAKE AS DIRECTED.    OneTouch Verio test strips strip TEST THREE TIMES DAILY    propylene glycol-glycerin (Soothe) 0.6-0.6 % dropperette ophthalmic solution Soothe 0.6-0.6 % Ophthalmic Solution   Refills: 0       Active    sertraline (ZOLOFT) 50 mg, oral, Daily    sodium chloride (Ayr) 0.65 % nasal drops 2 SPRAYS EACH NOSTRIL AS NEEDED (WINTER)    zinc sulfate (Zincate) 220 (50 Zn) MG capsule Zinc CAPS   Refills: 0       Active       Allergies  Hizentra [immun glob g(igg)-pro-iga 0-50]; Immune globulin,gamma (igg) human; Octagam [immun globg(igg)-malt-iga  ov50]; Pramipexole; Ropinirole; Sulfur; Tramadol; Amoxicillin; and Nabumetone    Review of Systems   Constitutional:  Negative for appetite change and fever.   HENT:  Negative for sore throat.         Denies dry mouth   Eyes:  Negative for visual disturbance.   Respiratory:  Negative for cough and shortness of breath.    Cardiovascular:  Negative for chest pain.   Gastrointestinal:  Negative for abdominal pain, constipation, diarrhea, nausea and vomiting.   Endocrine: Negative for polydipsia and polyuria.   Genitourinary:  Negative for frequency.   Musculoskeletal:  Positive for arthralgias.   Skin:  Negative for rash.   Neurological:  Positive for numbness. Negative for headaches.   Psychiatric/Behavioral:  The patient is not nervous/anxious.          Last Recorded Vitals  Blood pressure (!) 159/98, pulse 102, weight 75.3 kg (166 lb).    Physical Exam  Constitutional:       General: She is not in acute distress.  HENT:      Head: Normocephalic.   Eyes:      Extraocular Movements: Extraocular movements intact.   Neck:      Thyroid: No thyroid mass or thyromegaly.   Cardiovascular:      Pulses:           Radial pulses are 2+ on the right side and 2+ on the left side.   Musculoskeletal:      Right lower leg: No edema.      Left lower leg: No edema.   Lymphadenopathy:      Cervical: No cervical adenopathy.   Neurological:      Mental Status: She is alert.      Motor: No tremor.   Psychiatric:         Mood and Affect: Affect normal.          Relevant Results  GLUCOSE (mg/dL)   Date Value   03/19/2025 213 (H)   02/07/2025 212 (H)     Glucose (mg/dL)   Date Value   01/06/2025 201 (H)   10/31/2024 186 (H)   08/04/2023 162 (H)   05/23/2022 373 (H)   06/10/2021 305 (H)     POC HEMOGLOBIN A1c (%)   Date Value   02/17/2025 7.9 (A)   11/13/2023 8.2 (A)     Hemoglobin A1C (%)   Date Value   08/04/2023 7.0 (A)   05/23/2022 7.8 (A)   06/09/2021 11.6     CARBON DIOXIDE (mmol/L)   Date Value   03/19/2025 27   02/07/2025 26      Bicarbonate (mmol/L)   Date Value   01/06/2025 26   10/31/2024 30   08/04/2023 28   05/23/2022 28   06/10/2021 25     UREA NITROGEN (BUN) (mg/dL)   Date Value   03/19/2025 13   02/07/2025 17     Urea Nitrogen (mg/dL)   Date Value   01/06/2025 15   10/31/2024 22   08/04/2023 14   05/23/2022 11   06/10/2021 7     Creatinine (mg/dL)   Date Value   01/06/2025 0.61   10/31/2024 0.54   08/04/2023 0.58   05/23/2022 0.61   06/10/2021 0.50     CREATININE (mg/dL)   Date Value   03/19/2025 0.73   02/07/2025 0.59       IMPRESSION  TYPE 2 DIABETES MELLITUS  Rapid A1c 7.5%  Reasonable overall glucose control  More hyperglycemic after recent reaction to biosimilar medication      RECOMMENDATIONS  Continue glipizide 10 mg twice daily for now.  Decrease back to 2.5 mg three times daily as your glucose comes back town.  May still consider Mounjaro if covered  Follow up 3 months         [1]   Family History  Problem Relation Name Age of Onset    No Known Problems Mother      Atrial fibrillation Father      Mitral valve prolapse Father      Ovarian cancer Maternal Great-Grandmother  65    Glaucoma Other grandfather     Other (compressed vertebrea) Other      Arthritis Other      Other (adverse food reaction) Other family         not anaphylactic    Other (anaphylaxis due to bee venom) Other family     Other (complain of allergic reaction seasonal) Other family     Heart failure Other family     Eczema Other family     Emphysema Other family     Other (hypogammaglobulinemia) Other family     Irritable bowel syndrome Other family     Other (nasal polyps) Other family     Breast cancer Neg Hx

## 2025-06-02 NOTE — PATIENT INSTRUCTIONS
A1c 7.5%    RECOMMENDATIONS  Continue glipizide 10 mg twice daily for now.  Decrease back to 2.5 mg three times daily as your glucose comes back town.  May still consider Mounjaro if covered  Follow up 3 months

## 2025-06-05 LAB
ALBUMIN SERPL-MCNC: 4 G/DL (ref 3.6–5.1)
ALP SERPL-CCNC: 87 U/L (ref 31–125)
ALT SERPL-CCNC: 13 U/L (ref 6–29)
ANION GAP SERPL CALCULATED.4IONS-SCNC: 8 MMOL/L (CALC) (ref 7–17)
AST SERPL-CCNC: 15 U/L (ref 10–35)
BASOPHILS # BLD AUTO: 31 CELLS/UL (ref 0–200)
BASOPHILS NFR BLD AUTO: 0.6 %
BILIRUB SERPL-MCNC: 0.3 MG/DL (ref 0.2–1.2)
BUN SERPL-MCNC: 8 MG/DL (ref 7–25)
CALCIUM SERPL-MCNC: 9.4 MG/DL (ref 8.6–10.2)
CHLORIDE SERPL-SCNC: 103 MMOL/L (ref 98–110)
CO2 SERPL-SCNC: 31 MMOL/L (ref 20–32)
CREAT SERPL-MCNC: 0.6 MG/DL (ref 0.5–0.99)
EGFRCR SERPLBLD CKD-EPI 2021: 111 ML/MIN/1.73M2
EOSINOPHIL # BLD AUTO: 140 CELLS/UL (ref 15–500)
EOSINOPHIL NFR BLD AUTO: 2.7 %
ERYTHROCYTE [DISTWIDTH] IN BLOOD BY AUTOMATED COUNT: 17.5 % (ref 11–15)
GLUCOSE SERPL-MCNC: 171 MG/DL (ref 65–99)
HCT VFR BLD AUTO: 36.1 % (ref 35–45)
HGB BLD-MCNC: 10.9 G/DL (ref 11.7–15.5)
IGA SERPL-MCNC: <5 MG/DL (ref 47–310)
IGG SERPL-MCNC: <40 MG/DL (ref 600–1640)
IGM SERPL-MCNC: <5 MG/DL (ref 50–300)
LYMPHOCYTES # BLD AUTO: 1092 CELLS/UL (ref 850–3900)
LYMPHOCYTES NFR BLD AUTO: 21 %
MCH RBC QN AUTO: 24.8 PG (ref 27–33)
MCHC RBC AUTO-ENTMCNC: 30.2 G/DL (ref 32–36)
MCV RBC AUTO: 82.2 FL (ref 80–100)
MONOCYTES # BLD AUTO: 442 CELLS/UL (ref 200–950)
MONOCYTES NFR BLD AUTO: 8.5 %
NEUTROPHILS # BLD AUTO: 3494 CELLS/UL (ref 1500–7800)
NEUTROPHILS NFR BLD AUTO: 67.2 %
PLATELET # BLD AUTO: 282 THOUSAND/UL (ref 140–400)
PMV BLD REES-ECKER: 9.8 FL (ref 7.5–12.5)
POTASSIUM SERPL-SCNC: 3.9 MMOL/L (ref 3.5–5.3)
PROT SERPL-MCNC: 5.7 G/DL (ref 6.1–8.1)
RBC # BLD AUTO: 4.39 MILLION/UL (ref 3.8–5.1)
S PN DA SERO 19F IGG SER-MCNC: <0.3 UG/ML
S PNEUM DA 1 IGG SER-MCNC: <0.3 UG/ML
S PNEUM DA 10A IGG SER-MCNC: <0.3 UG/ML
S PNEUM DA 11A IGG SER-MCNC: <0.3 UG/ML
S PNEUM DA 12F IGG SER-MCNC: <0.3 UG/ML
S PNEUM DA 14 IGG SER-MCNC: <0.3
S PNEUM DA 15B IGG SER-MCNC: <0.3 UG/ML
S PNEUM DA 17F IGG SER-MCNC: <0.3 UG/ML
S PNEUM DA 18C IGG SER-MCNC: <0.3
S PNEUM DA 19A IGG SER-MCNC: <0.3 UG/ML
S PNEUM DA 2 IGG SER-MCNC: <0.3 UG/ML
S PNEUM DA 20A IGG SER-MCNC: <0.3 UG/ML
S PNEUM DA 22F IGG SER-MCNC: <0.3 UG/ML
S PNEUM DA 23F IGG SER-MCNC: <0.3 UG/ML
S PNEUM DA 3 IGG SER-MCNC: <0.3 UG/ML
S PNEUM DA 33F IGG SER-MCNC: <0.3 UG/ML
S PNEUM DA 4 IGG SER-MCNC: <0.3 UG/ML
S PNEUM DA 5 IGG SER-MCNC: <0.3 UG/ML
S PNEUM DA 6B IGG SER-MCNC: <0.3 UG/ML
S PNEUM DA 7F IGG SER-MCNC: <0.3 UG/ML
S PNEUM DA 8 IGG SER-MCNC: <0.3 UG/ML
S PNEUM DA 9N IGG SER-MCNC: <0.3 UG/ML
S PNEUM DA 9V IGG SER-MCNC: <0.3 UG/ML
SODIUM SERPL-SCNC: 142 MMOL/L (ref 135–146)
WBC # BLD AUTO: 5.2 THOUSAND/UL (ref 3.8–10.8)

## 2025-06-13 ENCOUNTER — HOSPITAL ENCOUNTER (OUTPATIENT)
Dept: RADIOLOGY | Facility: CLINIC | Age: 50
Discharge: HOME | End: 2025-06-13
Payer: COMMERCIAL

## 2025-06-13 DIAGNOSIS — B99.9 RECURRENT INFECTIONS: ICD-10-CM

## 2025-06-13 DIAGNOSIS — J47.9 BRONCHIECTASIS WITHOUT COMPLICATION (MULTI): ICD-10-CM

## 2025-06-13 PROCEDURE — 71250 CT THORAX DX C-: CPT | Performed by: RADIOLOGY

## 2025-06-13 PROCEDURE — 71250 CT THORAX DX C-: CPT

## 2025-06-27 DIAGNOSIS — G25.81 RESTLESS LEG SYNDROME: ICD-10-CM

## 2025-06-30 RX ORDER — GABAPENTIN 600 MG/1
TABLET ORAL
Qty: 120 TABLET | Refills: 2 | Status: SHIPPED | OUTPATIENT
Start: 2025-06-30

## 2025-08-01 DIAGNOSIS — M19.90 INFLAMMATORY ARTHRITIS: ICD-10-CM

## 2025-08-01 DIAGNOSIS — Z79.899 HIGH RISK MEDICATION USE: ICD-10-CM

## 2025-09-08 ENCOUNTER — APPOINTMENT (OUTPATIENT)
Dept: ENDOCRINOLOGY | Facility: CLINIC | Age: 50
End: 2025-09-08
Payer: COMMERCIAL

## 2025-11-17 ENCOUNTER — APPOINTMENT (OUTPATIENT)
Dept: SLEEP MEDICINE | Facility: CLINIC | Age: 50
End: 2025-11-17
Payer: COMMERCIAL

## 2025-11-18 ENCOUNTER — APPOINTMENT (OUTPATIENT)
Dept: OBSTETRICS AND GYNECOLOGY | Facility: CLINIC | Age: 50
End: 2025-11-18
Payer: COMMERCIAL

## 2025-12-15 ENCOUNTER — APPOINTMENT (OUTPATIENT)
Dept: ENDOCRINOLOGY | Facility: CLINIC | Age: 50
End: 2025-12-15
Payer: COMMERCIAL